# Patient Record
Sex: FEMALE | Race: WHITE | Employment: UNEMPLOYED | URBAN - METROPOLITAN AREA
[De-identification: names, ages, dates, MRNs, and addresses within clinical notes are randomized per-mention and may not be internally consistent; named-entity substitution may affect disease eponyms.]

---

## 2017-03-28 ENCOUNTER — OFFICE VISIT (OUTPATIENT)
Dept: FAMILY MEDICINE CLINIC | Age: 32
End: 2017-03-28
Payer: COMMERCIAL

## 2017-03-28 VITALS
WEIGHT: 177 LBS | BODY MASS INDEX: 29.49 KG/M2 | DIASTOLIC BLOOD PRESSURE: 70 MMHG | HEART RATE: 64 BPM | SYSTOLIC BLOOD PRESSURE: 110 MMHG | RESPIRATION RATE: 18 BRPM | HEIGHT: 65 IN

## 2017-03-28 DIAGNOSIS — R45.89 DYSPHORIC MOOD: Primary | ICD-10-CM

## 2017-03-28 PROCEDURE — 99213 OFFICE O/P EST LOW 20 MIN: CPT | Performed by: NURSE PRACTITIONER

## 2017-03-28 RX ORDER — ESCITALOPRAM OXALATE 10 MG/1
10 TABLET ORAL DAILY
Qty: 30 TABLET | Refills: 1 | Status: SHIPPED | OUTPATIENT
Start: 2017-03-28 | End: 2017-05-24 | Stop reason: SDUPTHER

## 2017-03-28 ASSESSMENT — ENCOUNTER SYMPTOMS
COUGH: 0
RHINORRHEA: 0
SHORTNESS OF BREATH: 0
ABDOMINAL DISTENTION: 0
SINUS PRESSURE: 0
EYES NEGATIVE: 1

## 2017-04-19 ENCOUNTER — OFFICE VISIT (OUTPATIENT)
Dept: FAMILY MEDICINE CLINIC | Age: 32
End: 2017-04-19
Payer: COMMERCIAL

## 2017-04-19 VITALS
SYSTOLIC BLOOD PRESSURE: 118 MMHG | RESPIRATION RATE: 18 BRPM | BODY MASS INDEX: 29.99 KG/M2 | HEIGHT: 65 IN | WEIGHT: 180 LBS | HEART RATE: 66 BPM | DIASTOLIC BLOOD PRESSURE: 78 MMHG

## 2017-04-19 DIAGNOSIS — R45.89 DYSPHORIC MOOD: Primary | ICD-10-CM

## 2017-04-19 DIAGNOSIS — Z79.899 ON SSRI THERAPY: ICD-10-CM

## 2017-04-19 PROCEDURE — 99212 OFFICE O/P EST SF 10 MIN: CPT | Performed by: NURSE PRACTITIONER

## 2017-04-19 ASSESSMENT — ENCOUNTER SYMPTOMS
ABDOMINAL DISTENTION: 0
RHINORRHEA: 0
EYES NEGATIVE: 1
WHEEZING: 0
SINUS PRESSURE: 0
COUGH: 0

## 2017-05-24 ENCOUNTER — TELEPHONE (OUTPATIENT)
Dept: FAMILY MEDICINE CLINIC | Age: 32
End: 2017-05-24

## 2017-05-24 DIAGNOSIS — R45.89 DYSPHORIC MOOD: ICD-10-CM

## 2017-05-24 RX ORDER — ESCITALOPRAM OXALATE 20 MG/1
20 TABLET ORAL DAILY
Qty: 30 TABLET | Refills: 2 | Status: SHIPPED | OUTPATIENT
Start: 2017-05-24 | End: 2017-11-20 | Stop reason: ALTCHOICE

## 2017-11-20 ENCOUNTER — OFFICE VISIT (OUTPATIENT)
Dept: FAMILY MEDICINE CLINIC | Age: 32
End: 2017-11-20
Payer: COMMERCIAL

## 2017-11-20 VITALS
OXYGEN SATURATION: 98 % | SYSTOLIC BLOOD PRESSURE: 122 MMHG | HEART RATE: 87 BPM | DIASTOLIC BLOOD PRESSURE: 84 MMHG | WEIGHT: 199 LBS | BODY MASS INDEX: 33.15 KG/M2 | HEIGHT: 65 IN

## 2017-11-20 DIAGNOSIS — E66.09 CLASS 1 OBESITY DUE TO EXCESS CALORIES WITHOUT SERIOUS COMORBIDITY WITH BODY MASS INDEX (BMI) OF 33.0 TO 33.9 IN ADULT: Primary | ICD-10-CM

## 2017-11-20 PROCEDURE — 99213 OFFICE O/P EST LOW 20 MIN: CPT | Performed by: FAMILY MEDICINE

## 2017-11-20 RX ORDER — PHENTERMINE HYDROCHLORIDE 37.5 MG/1
37.5 TABLET ORAL
Qty: 30 TABLET | Refills: 0 | Status: SHIPPED | OUTPATIENT
Start: 2017-11-20 | End: 2017-12-20

## 2017-11-20 ASSESSMENT — PATIENT HEALTH QUESTIONNAIRE - PHQ9
1. LITTLE INTEREST OR PLEASURE IN DOING THINGS: 0
SUM OF ALL RESPONSES TO PHQ9 QUESTIONS 1 & 2: 0
2. FEELING DOWN, DEPRESSED OR HOPELESS: 0
SUM OF ALL RESPONSES TO PHQ QUESTIONS 1-9: 0

## 2017-11-20 NOTE — PROGRESS NOTES
HPI Notes    Name: Olaf Christianson  : 1985         Chief Complaint:     Chief Complaint   Patient presents with    Obesity     patient looking to start a diet medication        History of Present Illness:      Olaf Christianson is a 29 y.o.  female who presents with Obesity (patient looking to start a diet medication )      HPI  Patient here to discuss starting medication to help her lose weight. Patient states that she has tried to lose weight in the past with diet and exercise, but has not been successful as she would like to be. Patient would like to consider Adipex. Patient denies any congenital cardiac abnormalities or other significant cardiac history. Patient denies any chest pain, shortness of breath, blurred vision, or headaches. No other acute problems or complaints. Past Medical History:     History reviewed. No pertinent past medical history. Reviewed all health maintenance requirements and ordered appropriate tests  Health Maintenance Due   Topic Date Due    HIV screen  2002    DTaP/Tdap/Td vaccine (1 - Tdap) 2006    Cervical cancer screen  12/10/2015    Flu vaccine (1) 2017       Past Surgical History:     Past Surgical History:   Procedure Laterality Date    DILATION AND CURETTAGE OF UTERUS      blighted ovum    DILATION AND CURETTAGE OF UTERUS      [facility]yp [facility]    TUBAL LIGATION          Medications:       Prior to Admission medications    Medication Sig Start Date End Date Taking? Authorizing Provider   phentermine (ADIPEX-P) 37.5 MG tablet Take 1 tablet by mouth every morning (before breakfast) . 17 Yes Mague Ee DO        Allergies:       Sulfa antibiotics    Social History:     Tobacco:    reports that she has never smoked. She has never used smokeless tobacco.  Alcohol:      reports that she drinks alcohol. Drug Use:  reports that she does not use drugs. Family History:     History reviewed.  No pertinent family history. Review of Systems:     Positive and Negative as described in HPI    Review of Systems   Constitutional: Negative for activity change, appetite change, fever and unexpected weight change. HENT: Negative for ear discharge, ear pain and trouble swallowing. Eyes: Negative for photophobia and visual disturbance. Respiratory: Negative for cough, shortness of breath and wheezing. Cardiovascular: Negative for chest pain and palpitations. Gastrointestinal: Negative for abdominal distention, abdominal pain, constipation, diarrhea, nausea and vomiting. Endocrine: Negative for polydipsia, polyphagia and polyuria. Musculoskeletal: Negative for arthralgias, back pain, gait problem, joint swelling, myalgias, neck pain and neck stiffness. Skin: Negative for color change and rash. Neurological: Negative for dizziness, syncope, weakness, light-headedness and headaches. Psychiatric/Behavioral: Negative for dysphoric mood. The patient is not nervous/anxious. Physical Exam:     Physical Exam   Constitutional: She is oriented to person, place, and time. She appears well-developed and well-nourished. HENT:   Head: Normocephalic and atraumatic. Right Ear: External ear normal.   Left Ear: External ear normal.   Eyes: Conjunctivae and EOM are normal.   Neck: Normal range of motion. Neck supple. No thyromegaly present. Cardiovascular: Normal rate, regular rhythm and normal heart sounds. Exam reveals no gallop and no friction rub. No murmur heard. Pulmonary/Chest: Effort normal and breath sounds normal. No respiratory distress. She has no wheezes. She has no rales. She exhibits no tenderness. Abdominal: Soft. Bowel sounds are normal. She exhibits no distension. There is no tenderness. There is no rebound and no guarding. Musculoskeletal: Normal range of motion. She exhibits no edema. Lymphadenopathy:     She has no cervical adenopathy.    Neurological: She is alert and oriented to person, place, and time. She exhibits normal muscle tone. Coordination normal.   Skin: Skin is warm and dry. No rash noted. No erythema. Psychiatric: She has a normal mood and affect. Her behavior is normal. Judgment and thought content normal.   Nursing note and vitals reviewed. Vitals:  /84   Pulse 87   Ht 5' 5\" (1.651 m)   Wt 199 lb (90.3 kg)   LMP 11/19/2017   SpO2 98%   BMI 33.12 kg/m²       Data:     No results found for: NA, K, CL, CO2, BUN, CREATININE, GLUCOSE, PROT, LABALBU, BILITOT, ALKPHOS, AST, ALT  No results found for: WBC, RBC, HGB, HCT, MCV, MCH, MCHC, RDW, PLT, MPV  No results found for: TSH  No results found for: CHOL, HDL, PSA, LABA1C       Assessment:       1. Class 1 obesity due to excess calories without serious comorbidity with body mass index (BMI) of 33.0 to 33.9 in adult         Plan:     1. Obesity-done which shows normal sinus rhythm, will Rx Adipex. Discussed diet and exercise in addition to the medication. Patient voiced understanding. We will follow-up in one month for weight and blood pressure check. Completed Refills   Requested Prescriptions     Signed Prescriptions Disp Refills    phentermine (ADIPEX-P) 37.5 MG tablet 30 tablet 0     Sig: Take 1 tablet by mouth every morning (before breakfast) . Return in about 1 month (around 12/20/2017) for Weight and BP check. Orders Placed This Encounter   Medications    phentermine (ADIPEX-P) 37.5 MG tablet     Sig: Take 1 tablet by mouth every morning (before breakfast) . Dispense:  30 tablet     Refill:  0     No orders of the defined types were placed in this encounter. There are no Patient Instructions on file for this visit.     Electronically signed by Hilary Watkins DO on 12/3/2017 at 10:51 PM         Completed Refills   Requested Prescriptions     Signed Prescriptions Disp Refills    phentermine (ADIPEX-P) 37.5 MG tablet 30 tablet 0     Sig: Take 1 tablet by mouth every morning (before breakfast) . Dong Pouch received counseling on the following healthy behaviors: nutrition, exercise and medication adherence  Reviewed prior labs and health maintenance. Continue current medications, diet and exercise. Discussed use, benefit, and side effects of prescribed medications. Barriers to medication compliance addressed. Patient given educational materials - see patient instructions. All patient questions answered. Patient voiced understanding.

## 2017-12-03 ASSESSMENT — ENCOUNTER SYMPTOMS
COUGH: 0
COLOR CHANGE: 0
TROUBLE SWALLOWING: 0
PHOTOPHOBIA: 0
SHORTNESS OF BREATH: 0
WHEEZING: 0
BACK PAIN: 0
ABDOMINAL PAIN: 0
CONSTIPATION: 0
DIARRHEA: 0
NAUSEA: 0
ABDOMINAL DISTENTION: 0
VOMITING: 0

## 2018-02-05 ENCOUNTER — TELEPHONE (OUTPATIENT)
Dept: FAMILY MEDICINE CLINIC | Age: 33
End: 2018-02-05

## 2018-02-05 DIAGNOSIS — N30.00 ACUTE CYSTITIS WITHOUT HEMATURIA: Primary | ICD-10-CM

## 2018-02-05 RX ORDER — NITROFURANTOIN 25; 75 MG/1; MG/1
100 CAPSULE ORAL 2 TIMES DAILY
Qty: 14 CAPSULE | Refills: 0 | Status: SHIPPED | OUTPATIENT
Start: 2018-02-05 | End: 2018-02-12

## 2018-02-23 ENCOUNTER — HOSPITAL ENCOUNTER (OUTPATIENT)
Age: 33
Setting detail: SPECIMEN
Discharge: HOME | End: 2018-02-23
Payer: COMMERCIAL

## 2018-02-23 ENCOUNTER — OFFICE VISIT (OUTPATIENT)
Dept: PRIMARY CARE CLINIC | Age: 33
End: 2018-02-23
Payer: COMMERCIAL

## 2018-02-23 VITALS
TEMPERATURE: 97.8 F | DIASTOLIC BLOOD PRESSURE: 83 MMHG | HEART RATE: 74 BPM | HEIGHT: 65 IN | RESPIRATION RATE: 18 BRPM | SYSTOLIC BLOOD PRESSURE: 125 MMHG | WEIGHT: 187.1 LBS | BODY MASS INDEX: 31.17 KG/M2 | OXYGEN SATURATION: 99 %

## 2018-02-23 DIAGNOSIS — R30.0 BURNING WITH URINATION: ICD-10-CM

## 2018-02-23 DIAGNOSIS — R30.0 DYSURIA: Primary | ICD-10-CM

## 2018-02-23 LAB
BILIRUBIN, POC: NORMAL
BLOOD URINE, POC: NORMAL
CLARITY, POC: CLEAR
COLOR, POC: YELLOW
DIRECT EXAM: ABNORMAL
GLUCOSE URINE, POC: NORMAL
KETONES, POC: NORMAL
LEUKOCYTE EST, POC: NORMAL
Lab: ABNORMAL
NITRITE, POC: NORMAL
PH, POC: 7
PROTEIN, POC: NORMAL
SPECIFIC GRAVITY, POC: 1.02
SPECIMEN DESCRIPTION: ABNORMAL
STATUS: ABNORMAL
UROBILINOGEN, POC: 0.2

## 2018-02-23 PROCEDURE — 87210 SMEAR WET MOUNT SALINE/INK: CPT

## 2018-02-23 PROCEDURE — 81003 URINALYSIS AUTO W/O SCOPE: CPT | Performed by: NURSE PRACTITIONER

## 2018-02-23 PROCEDURE — 87070 CULTURE OTHR SPECIMN AEROBIC: CPT

## 2018-02-23 PROCEDURE — 99213 OFFICE O/P EST LOW 20 MIN: CPT | Performed by: NURSE PRACTITIONER

## 2018-02-23 PROCEDURE — 87086 URINE CULTURE/COLONY COUNT: CPT

## 2018-02-23 ASSESSMENT — ENCOUNTER SYMPTOMS
GASTROINTESTINAL NEGATIVE: 1
RESPIRATORY NEGATIVE: 1

## 2018-02-23 NOTE — PROGRESS NOTES
[facility] WALK-[facility]  [place] [address] [id number]  Dept: [phone]  Dept Fax: [phone]    Christa Mitchell is a 29 y.o. female who presents to the [address] [place] [address] in Care today for her medical conditions/complaints as noted below. Christa Mitchell is c/o of Urinary Tract Infection (buring)      HPI:   HPI  Christa Mitchell is a 29 y.o. female who presents with x \" a few weeks ago I had a uti and I completed the antibiotic and a about 5 days After completing the antibiotic the symptoms returned. \"  Patient reports the only symptom she is having is burning after urination. She reports it feels like the burning is at the refill. There is no burning during urination. No urgency. No incontinence. No abdominal pain or pressure. No flank pain. No fevers. There is no nausea and vomiting. She has noticed an odor with the urine and some increase in vaginal discharge. There is been no vaginal itching. She reports she is in a monogamous relationship with her  only. She has no concerns for STI's. No past medical history on file. No current outpatient prescriptions on file. No current facility-administered medications for this visit. Allergies   Allergen Reactions    Sulfa Antibiotics Hives    Sulfasalazine Hives       Subjective:      Review of Systems   Constitutional: Negative for chills and fever. HENT: Negative. Respiratory: Negative. Cardiovascular: Negative. Gastrointestinal: Negative. Genitourinary: Positive for vaginal discharge. Negative for decreased urine volume, dysuria, flank pain, frequency, genital sores, hematuria, pelvic pain, urgency, vaginal bleeding and vaginal pain. Skin: Negative. Neurological: Negative. Hematological: Negative. Objective:     Physical Exam   Constitutional: She appears well-developed and well-nourished.     Appearing to be of stated age with warm and dry skin, no apparent distress; well-appearing, well-hydrated, non-toxic, comfortable, alert and oriented, pleasant and talkative, in no apparent distress. Cardiovascular: Normal rate and regular rhythm. Pulmonary/Chest: Effort normal and breath sounds normal.   Abdominal: Soft. Normal appearance. There is no CVA tenderness. Genitourinary: There is no rash or lesion on the right labia. There is no rash or lesion on the left labia. Cervix exhibits discharge (clear stringy mucous ). Cervix exhibits no friability. No erythema in the vagina. Vaginal discharge (clear stringy discharge ) found. Skin: Skin is warm. Nursing note and vitals reviewed. /83   Pulse 74   Temp 97.8 °F (36.6 °C) (Oral)   Resp 18   Ht 5' 5\" (1.651 m)   Wt 187 lb 1.6 oz (84.9 kg)   LMP 02/02/2018   SpO2 99%   BMI 31.14 kg/m²     Assessment:     1. Dysuria     2. Burning with urination  POCT Urinalysis No Micro (Auto)    Urine Culture    Wet Prep, Genital    Genital Culture       Plan:   Elena Smith was seen today for urinary tract infection. Diagnoses and all orders for this visit:    Dysuria    Burning with urination  -     POCT Urinalysis No Micro (Auto)    -   Negative  -     Urine Culture  -     Wet Prep, Genital  -     Genital Culture    Discussed exam, POCT findings, plan of care (including prescriptive and supportive as listed below) and follow-up at length with patient. Labs as above recommended today as UA negative. Urine sample will be sent for culture. Understanding voiced. ED for any worsening and or including fever, nausea/vomiting,inability to retain antibiotics,  back pain. Questions answered. They verbalized understanding and agreement. Elena Smith and or guardian received counseling regarding nutrition    Return for ED for worsening symptoms. No orders of the defined types were placed in this encounter.          Electronically signed by Luis Stewart NP on 2/23/2018 at 6:12 PM

## 2018-02-23 NOTE — PATIENT INSTRUCTIONS
Patient Education        Painful Urination (Dysuria): Care Instructions  Your Care Instructions  Burning pain with urination (dysuria) is a common symptom of a urinary tract infection or other urinary problems. The bladder may become inflamed. This can cause pain when the bladder fills and empties. You may also feel pain if the tube that carries urine from the bladder to the outside of the body (urethra) gets irritated or infected. Sexually transmitted infections (STIs) also may cause pain when you urinate. Sometimes the pain can be caused by things other than an infection. The urethra can be irritated by soaps, perfumes, or foreign objects in the urethra. Kidney stones can cause pain when they pass through the urethra. The cause may be hard to find. You may need tests. Treatment for painful urination depends on the cause. Follow-up care is a key part of your treatment and safety. Be sure to make and go to all appointments, and call your doctor if you are having problems. It's also a good idea to know your test results and keep a list of the medicines you take. How can you care for yourself at home? · Drink extra water for the next day or two. This will help make the urine less concentrated. (If you have kidney, heart, or liver disease and have to limit fluids, talk with your doctor before you increase the amount of fluids you drink.)  · Avoid drinks that are carbonated or have caffeine. They can irritate the bladder. · Urinate often. Try to empty your bladder each time. For women:  · Urinate right after you have sex. · After going to the bathroom, wipe from front to back. · Avoid douches, bubble baths, and feminine hygiene sprays. And avoid other feminine hygiene products that have deodorants. When should you call for help? Call your doctor now or seek immediate medical care if:  ? · You have new symptoms, such as fever, nausea, or vomiting. ? · You have new or worse symptoms of a urinary problem.

## 2018-02-24 LAB
CULTURE: NORMAL
CULTURE: NORMAL
Lab: NORMAL
SPECIMEN DESCRIPTION: NORMAL
SPECIMEN DESCRIPTION: NORMAL
STATUS: NORMAL

## 2018-02-27 LAB
CULTURE: NORMAL
Lab: NORMAL
SPECIMEN DESCRIPTION: NORMAL
SPECIMEN DESCRIPTION: NORMAL
STATUS: NORMAL

## 2018-03-15 ENCOUNTER — OFFICE VISIT (OUTPATIENT)
Dept: FAMILY MEDICINE CLINIC | Age: 33
End: 2018-03-15
Payer: COMMERCIAL

## 2018-03-15 VITALS
SYSTOLIC BLOOD PRESSURE: 110 MMHG | DIASTOLIC BLOOD PRESSURE: 70 MMHG | HEIGHT: 66 IN | RESPIRATION RATE: 18 BRPM | WEIGHT: 181 LBS | BODY MASS INDEX: 29.09 KG/M2 | OXYGEN SATURATION: 98 % | HEART RATE: 94 BPM

## 2018-03-15 DIAGNOSIS — F41.9 ANXIETY: Primary | ICD-10-CM

## 2018-03-15 DIAGNOSIS — F32.9 REACTIVE DEPRESSION: ICD-10-CM

## 2018-03-15 PROCEDURE — 99213 OFFICE O/P EST LOW 20 MIN: CPT | Performed by: NURSE PRACTITIONER

## 2018-03-15 RX ORDER — ESCITALOPRAM OXALATE 20 MG/1
20 TABLET ORAL DAILY
Qty: 30 TABLET | Refills: 2 | Status: SHIPPED | OUTPATIENT
Start: 2018-03-15 | End: 2018-07-31 | Stop reason: SDUPTHER

## 2018-03-15 ASSESSMENT — ENCOUNTER SYMPTOMS
SORE THROAT: 0
EYES NEGATIVE: 1
CHEST TIGHTNESS: 0
SINUS PAIN: 0
ABDOMINAL DISTENTION: 0

## 2018-03-15 NOTE — PATIENT INSTRUCTIONS
play a relaxation tape while you drive a car. When should you call for help? Call 911 anytime you think you may need emergency care. For example, call if:  ? · You feel you cannot stop from hurting yourself or someone else. ? Keep the numbers for these national suicide hotlines: 0-285-154-TALK (8-568.334.9982) and 0-758-JLFXFTX (8-163.587.3897). If you or someone you know talks about suicide or feeling hopeless, get help right away. ? Watch closely for changes in your health, and be sure to contact your doctor if:  ? · You have anxiety or fear that affects your life. ? · You have symptoms of anxiety that are new or different from those you had before. Where can you learn more? Go to https://Discount Rampsjunioreb.Qui.lt. org and sign in to your [employer] account. Enter P754 in the [employer] box to learn more about \"Anxiety Disorder: Care Instructions. \"     If you do not have an account, please click on the \"Sign Up Now\" link. Current as of: May 12, 2017  Content Version: 11.5  © 5304-6039 Healthwise, Incorporated. Care instructions adapted under license by [facility] ([facility]). If you have questions about a medical condition or this instruction, always ask your healthcare professional. [address] any warranty or liability for your use of this information.

## 2018-03-15 NOTE — PROGRESS NOTES
MG tablet     Sig: Take 1 tablet by mouth daily     Dispense:  30 tablet     Refill:  2     No orders of the defined types were placed in this encounter. Jaime Mohs received counseling on the following healthy behaviors: nutrition, exercise and medication adherence  Reviewed prior labs and health maintenance. Continue current medications, diet and exercise. Discussed use, benefit, and side effects of prescribed medications. Barriers to medication compliance addressed. Patient given educational materials - see patient instructions. All patient questions answered. Patient voiced understanding.           Electronically signed by Jesi Santana NP on 3/15/2018 at 10:52 PM

## 2018-04-20 ENCOUNTER — OFFICE VISIT (OUTPATIENT)
Dept: FAMILY MEDICINE CLINIC | Age: 33
End: 2018-04-20
Payer: COMMERCIAL

## 2018-04-20 VITALS
OXYGEN SATURATION: 98 % | HEART RATE: 84 BPM | HEIGHT: 65 IN | SYSTOLIC BLOOD PRESSURE: 120 MMHG | WEIGHT: 183 LBS | RESPIRATION RATE: 18 BRPM | DIASTOLIC BLOOD PRESSURE: 66 MMHG | BODY MASS INDEX: 30.49 KG/M2

## 2018-04-20 DIAGNOSIS — Z79.899 ON SSRI THERAPY: ICD-10-CM

## 2018-04-20 DIAGNOSIS — F41.9 ANXIETY AND DEPRESSION: Primary | ICD-10-CM

## 2018-04-20 DIAGNOSIS — F32.A ANXIETY AND DEPRESSION: Primary | ICD-10-CM

## 2018-04-20 PROCEDURE — 99213 OFFICE O/P EST LOW 20 MIN: CPT | Performed by: NURSE PRACTITIONER

## 2018-04-20 ASSESSMENT — ENCOUNTER SYMPTOMS
EYES NEGATIVE: 1
ABDOMINAL DISTENTION: 0
SORE THROAT: 0
CHEST TIGHTNESS: 0

## 2018-05-22 ENCOUNTER — TELEPHONE (OUTPATIENT)
Dept: FAMILY MEDICINE CLINIC | Age: 33
End: 2018-05-22

## 2018-05-23 RX ORDER — ERYTHROMYCIN 5 MG/G
1 OINTMENT OPHTHALMIC NIGHTLY
Qty: 1 TUBE | Refills: 0 | Status: SHIPPED | OUTPATIENT
Start: 2018-05-23 | End: 2019-04-03 | Stop reason: ALTCHOICE

## 2018-07-31 ENCOUNTER — OFFICE VISIT (OUTPATIENT)
Dept: FAMILY MEDICINE CLINIC | Age: 33
End: 2018-07-31
Payer: COMMERCIAL

## 2018-07-31 VITALS — HEART RATE: 80 BPM | RESPIRATION RATE: 18 BRPM | SYSTOLIC BLOOD PRESSURE: 122 MMHG | DIASTOLIC BLOOD PRESSURE: 60 MMHG

## 2018-07-31 DIAGNOSIS — B35.3 TINEA PEDIS OF BOTH FEET: ICD-10-CM

## 2018-07-31 DIAGNOSIS — F32.9 REACTIVE DEPRESSION: Primary | ICD-10-CM

## 2018-07-31 DIAGNOSIS — F41.9 ANXIETY: ICD-10-CM

## 2018-07-31 DIAGNOSIS — H73.893 RETRACTED TYMPANIC MEMBRANE, BILATERAL: ICD-10-CM

## 2018-07-31 PROCEDURE — 99213 OFFICE O/P EST LOW 20 MIN: CPT | Performed by: NURSE PRACTITIONER

## 2018-07-31 RX ORDER — ESCITALOPRAM OXALATE 20 MG/1
20 TABLET ORAL DAILY
Qty: 90 TABLET | Refills: 1 | Status: SHIPPED | OUTPATIENT
Start: 2018-07-31 | End: 2019-03-11 | Stop reason: SDUPTHER

## 2018-07-31 RX ORDER — RANITIDINE 150 MG/1
150 TABLET ORAL NIGHTLY
Qty: 30 TABLET | Refills: 0 | Status: SHIPPED | OUTPATIENT
Start: 2018-07-31 | End: 2019-04-03 | Stop reason: ALTCHOICE

## 2018-07-31 RX ORDER — PRENATAL VIT 91/IRON/FOLIC/DHA 28-975-200
COMBINATION PACKAGE (EA) ORAL
Qty: 36 G | Refills: 1 | Status: SHIPPED | OUTPATIENT
Start: 2018-07-31 | End: 2019-04-03 | Stop reason: ALTCHOICE

## 2018-07-31 ASSESSMENT — ENCOUNTER SYMPTOMS
ABDOMINAL DISTENTION: 0
SORE THROAT: 0
CHEST TIGHTNESS: 0
WHEEZING: 0
COUGH: 0
EYES NEGATIVE: 1

## 2018-07-31 NOTE — PROGRESS NOTES
Subjective:      Patient ID: Alphonso Cage is a 34 y.o. female. Social History     Social History    Marital status:      Spouse name: N/A    Number of children: N/A    Years of education: N/A     Social History Main Topics    Smoking status: Never Smoker    Smokeless tobacco: Never Used    Alcohol use Yes    Drug use: No    Sexual activity: Not Asked     Other Topics Concern    None     Social History Narrative    None     History reviewed. No pertinent family history. History reviewed. No pertinent past medical history. HPI    Follow up lexapro medication doing well- lexapro 20 mg daily for depression. Separation but living together with . Managing children. Feels she is coping better. Not as angry and reactive. Feels life if okay. No insomnia improved since changing med time before bedtime    C/o toenail itching and fungus used over the counter cream offered to clean bathtub with white vinegar after bathing, blow toes dry with hair dryer after shower. May use prescription cream lamisil ordered. Change shoes and clean   May use lysol and dry in sun. C/o recent hearing loss no injury, ears feel funny sense like cannot make out noises well. Has tonsillar stones passes at times with cryptic tonsils. Review of Systems   Constitutional: Negative for activity change and fatigue. HENT: Negative for congestion, postnasal drip and sore throat. Eyes: Negative. Respiratory: Negative for cough, chest tightness and wheezing. Cardiovascular: Negative for chest pain. Gastrointestinal: Negative for abdominal distention. Genitourinary: Negative for difficulty urinating. Musculoskeletal: Negative for arthralgias. Neurological: Negative for dizziness. Psychiatric/Behavioral: Negative for dysphoric mood, self-injury and sleep disturbance. The patient is nervous/anxious.         Objective:   Physical Exam   Constitutional: She is oriented to person, place, and time. She appears well-developed and well-nourished. No distress. HENT:   Head: Normocephalic and atraumatic. Right Ear: External ear normal.   Left Ear: External ear normal.   Mouth/Throat: Oropharynx is clear and moist.   TM retracted bilaterally intact without infection. Left ear diminished visualization through lower TM discussed with pt risk cholesteatoma. Eyes: Pupils are equal, round, and reactive to light. No scleral icterus. Neck: Normal range of motion. Neck supple. No thyromegaly present. Cardiovascular: Normal rate, regular rhythm and normal heart sounds. No murmur heard. Pulmonary/Chest: Effort normal and breath sounds normal. No respiratory distress. She has no wheezes. Abdominal: Soft. There is no tenderness. Musculoskeletal: Normal range of motion. She exhibits no edema. Lymphadenopathy:     She has no cervical adenopathy. Neurological: She is alert and oriented to person, place, and time. Skin: Skin is warm and dry. No rash noted. Psychiatric: She has a normal mood and affect. Her behavior is normal. Judgment and thought content normal.   Nursing note and vitals reviewed. Assessment / Plan:   1. Reactive depression  Doing well  Recheck in 6 months    - escitalopram (LEXAPRO) 20 MG tablet; Take 1 tablet by mouth daily  Dispense: 90 tablet; Refill: 1    2. Tinea pedis of both feet  Suggestions given. - terbinafine (LAMISIL) 1 % cream; Apply topically 2 times daily to both feet  Dispense: 36 g; Refill: 1    3. Anxiety  Well controlled  - escitalopram (LEXAPRO) 20 MG tablet; Take 1 tablet by mouth daily  Dispense: 90 tablet; Refill: 1    4. Retracted tympanic membrane, bilateral  C/o hearing issues, TM retracted bilaterally, decrease daily zyrtec use. May consider zantac 150 mg nightly x 1 month  Monitor for improvement.      If hearing issues continue will refer for audiology eval/ent referral.     1.  Kevon Gaspar received counseling on the following healthy

## 2018-07-31 NOTE — PATIENT INSTRUCTIONS
Patient Education        Athlete's Foot: Care Instructions  Your Care Instructions    Athlete's foot is an itchy rash on the foot caused by an infection with a fungus. You can get it by going barefoot in wet public areas, such as swimming pools or locker rooms. Many times there is no clear reason why you get athlete's foot. You can easily treat athlete's foot by putting medicine on your feet for 1 to 6 weeks. In some cases, a doctor may prescribe pills to kill the fungus. Follow-up care is a key part of your treatment and safety. Be sure to make and go to all appointments, and call your doctor if you are having problems. It's also a good idea to know your test results and keep a list of the medicines you take. How can you care for yourself at home? · Your doctor may suggest an over-the counter lotion or spray or may prescribe a medicine. Take your medicines exactly as prescribed. Call your doctor if you think you are having a problem with your medicine. · Keep your feet clean and dry. · When you get dressed, put your socks on before your underwear. This can prevent the fungus from spreading from your feet to your groin. To prevent athlete's foot  · Wear flip-flops or other shower sandals in public locker rooms and showers and by the pool. · Dry between your toes after swimming or bathing. · Wear leather shoes or sandals, which let air get to your feet. · Change your socks as needed so your feet stay as dry as possible. · Use antifungal powder on your feet. When should you call for help? Watch closely for changes in your health, and be sure to contact your doctor if:    · You do not get better as expected. Where can you learn more? Go to https://chsamantha.LEHR. org and sign in to your [employer] account. Enter M498 in the [employer] box to learn more about \"Athlete's Foot: Care Instructions. \"     If you do not have an account, please click on the \"Sign Up Now\" link.   Current as of: October 5, 2017  Content Version: 11.6  © 4396-7313 [employer]orporated. Care instructions adapted under license by [facility] ([facility]). If you have questions about a medical condition or this instruction, always ask your healthcare professional. [address] any warranty or liability for your use of this information.

## 2019-03-11 DIAGNOSIS — F41.9 ANXIETY: ICD-10-CM

## 2019-03-11 DIAGNOSIS — F32.9 REACTIVE DEPRESSION: ICD-10-CM

## 2019-03-12 RX ORDER — ESCITALOPRAM OXALATE 20 MG/1
TABLET ORAL
Qty: 90 TABLET | Refills: 1 | Status: SHIPPED | OUTPATIENT
Start: 2019-03-12 | End: 2019-10-25

## 2019-03-13 DIAGNOSIS — K21.9 GASTROESOPHAGEAL REFLUX DISEASE WITHOUT ESOPHAGITIS: ICD-10-CM

## 2019-03-13 DIAGNOSIS — K21.00 GASTROESOPHAGEAL REFLUX DISEASE WITH ESOPHAGITIS: Primary | ICD-10-CM

## 2019-03-13 RX ORDER — OMEPRAZOLE 20 MG/1
20 CAPSULE, DELAYED RELEASE ORAL DAILY
Qty: 90 CAPSULE | Refills: 0 | Status: SHIPPED | OUTPATIENT
Start: 2019-03-13 | End: 2019-07-14 | Stop reason: SDUPTHER

## 2019-04-03 ENCOUNTER — OFFICE VISIT (OUTPATIENT)
Dept: FAMILY MEDICINE CLINIC | Age: 34
End: 2019-04-03
Payer: COMMERCIAL

## 2019-04-03 VITALS
BODY MASS INDEX: 33.48 KG/M2 | SYSTOLIC BLOOD PRESSURE: 138 MMHG | OXYGEN SATURATION: 98 % | HEART RATE: 110 BPM | TEMPERATURE: 98.6 F | WEIGHT: 201.2 LBS | DIASTOLIC BLOOD PRESSURE: 88 MMHG

## 2019-04-03 DIAGNOSIS — H66.001 ACUTE SUPPURATIVE OTITIS MEDIA OF RIGHT EAR WITHOUT SPONTANEOUS RUPTURE OF TYMPANIC MEMBRANE, RECURRENCE NOT SPECIFIED: Primary | ICD-10-CM

## 2019-04-03 DIAGNOSIS — J02.9 SORE THROAT: ICD-10-CM

## 2019-04-03 LAB — S PYO AG THROAT QL: NORMAL

## 2019-04-03 PROCEDURE — 99213 OFFICE O/P EST LOW 20 MIN: CPT | Performed by: NURSE PRACTITIONER

## 2019-04-03 PROCEDURE — 87880 STREP A ASSAY W/OPTIC: CPT | Performed by: NURSE PRACTITIONER

## 2019-04-03 RX ORDER — AMOXICILLIN 500 MG/1
500 CAPSULE ORAL 3 TIMES DAILY
Qty: 30 CAPSULE | Refills: 0 | Status: SHIPPED | OUTPATIENT
Start: 2019-04-03 | End: 2019-04-13

## 2019-04-03 RX ORDER — PHENTERMINE HYDROCHLORIDE 37.5 MG/1
37.5 CAPSULE ORAL EVERY MORNING
COMMUNITY
End: 2019-10-25 | Stop reason: ALTCHOICE

## 2019-04-03 ASSESSMENT — PATIENT HEALTH QUESTIONNAIRE - PHQ9
2. FEELING DOWN, DEPRESSED OR HOPELESS: 0
SUM OF ALL RESPONSES TO PHQ QUESTIONS 1-9: 0
1. LITTLE INTEREST OR PLEASURE IN DOING THINGS: 0
SUM OF ALL RESPONSES TO PHQ QUESTIONS 1-9: 0
SUM OF ALL RESPONSES TO PHQ9 QUESTIONS 1 & 2: 0

## 2019-04-03 NOTE — PROGRESS NOTES
[facility]  [facility] OF [place] [address] [address]  [address]  Dept: [phone]  Dept Fax: [phone]    Last encounter 7/31/2018    HPI:   Gustavo Wing is a 34 y.o. female who presentstoday for her medical conditions/complaints as noted below. Gustavo Wing is c/o of Cough (sore throat, bilateral ear pain)      HPI    Patient with c/o sore throat pressure last night, no drainage from ears. No fever. No chills. Feels hot. URI symptoms right ear pain,     No past medical history on file. Past Surgical History:   Procedure Laterality Date    DILATION AND CURETTAGE OF UTERUS      blighted ovum    DILATION AND CURETTAGE OF UTERUS      Polyp removal-[facility]    TUBAL LIGATION  2012       No family history on file. Social History     Tobacco Use    Smoking status: Never Smoker    Smokeless tobacco: Never Used   Substance Use Topics    Alcohol use: Yes      Current Outpatient Medications   Medication Sig Dispense Refill    phentermine (ADIPEX-P) 37.5 MG capsule Take 37.5 mg by mouth every morning.  amoxicillin (AMOXIL) 500 MG capsule Take 1 capsule by mouth 3 times daily for 10 days Right ear infection 30 capsule 0    omeprazole (PRILOSEC) 20 MG delayed release capsule Take 1 capsule by mouth Daily In am on empty stomach 90 capsule 0    escitalopram (LEXAPRO) 20 MG tablet TAKE 1 TABLET BY MOUTH ONCE DAILY 90 tablet 1     No current facility-administered medications for this visit. Allergies   Allergen Reactions    Sulfa Antibiotics Hives    Sulfasalazine Hives       Health Maintenance   Topic Date Due    Varicella Vaccine (1 of 2 - 13+ 2-dose series) 05/27/2000    DTaP/Tdap/Td vaccine (1 - Tdap) 05/27/2006    Cervical cancer screen  12/10/2015    HIV screen  04/12/2019 (Originally 5/27/2002)    Flu vaccine (Season Ended) 09/01/2019       Subjective:      Review of Systems   Constitutional: Positive for chills.  Negative MPV  No results found for: TSH  No results found for: CHOL, HDL, PSA, LABA1C       Assessment & Plan       1. Acute suppurative otitis media of right ear without spontaneous rupture of tympanic membrane, recurrence not specified  Amoxicillin finish antibiotics  Good hydration    2. Sore throat  Neg strep in office    - POCT rapid strep A                  Completed Refills   Requested Prescriptions     Signed Prescriptions Disp Refills    amoxicillin (AMOXIL) 500 MG capsule 30 capsule 0     Sig: Take 1 capsule by mouth 3 times daily for 10 days Right ear infection     Return if symptoms worsen or fail to improve. Orders Placed This Encounter   Medications    amoxicillin (AMOXIL) 500 MG capsule     Sig: Take 1 capsule by mouth 3 times daily for 10 days Right ear infection     Dispense:  30 capsule     Refill:  0     Orders Placed This Encounter   Procedures    POCT rapid strep A         Maris received counseling on the following healthy behaviors: nutrition, exercise and medication adherence  Reviewed prior labs and health maintenance. Continue current medications, diet and exercise. Discussed use, benefit, and side effects of prescribed medications. Barriers to medication compliance addressed. Patient given educational materials - see patient instructions. All patient questions answered. Patient voiced understanding.           Electronically signed by SHAHIDA Lovelace CNP on 4/4/2019 at 12:35 AM

## 2019-04-03 NOTE — PATIENT INSTRUCTIONS
SURVEY:    You may be receiving a survey from [employer] regarding your visit today. Please complete the survey to enable us to provide the highest quality of care to you and your family. If you cannot score us a very good on any question, please call the office to discuss how we could have made your experience a very good one. Thank you. Patient Education        Ear Infection (Otitis Media): Care Instructions  Your Care Instructions    An ear infection may start with a cold and affect the middle ear (otitis media). It can hurt a lot. Most ear infections clear up on their own in a couple of days. Most often you will not need antibiotics. This is because many ear infections are caused by a virus. Antibiotics don't work against a virus. Regular doses of pain medicines are the best way to reduce your fever and help you feel better. Follow-up care is a key part of your treatment and safety. Be sure to make and go to all appointments, and call your doctor if you are having problems. It's also a good idea to know your test results and keep a list of the medicines you take. How can you care for yourself at home? · Take pain medicines exactly as directed. ? If the doctor gave you a prescription medicine for pain, take it as prescribed. ? If you are not taking a prescription pain medicine, take an over-the-counter medicine, such as acetaminophen (Tylenol), ibuprofen (Advil, Motrin), or naproxen (Aleve). Read and follow all instructions on the label. ? Do not take two or more pain medicines at the same time unless the doctor told you to. Many pain medicines have acetaminophen, which is Tylenol. Too much acetaminophen (Tylenol) can be harmful. · Plan to take a full dose of pain reliever before bedtime. Getting enough sleep will help you get better. · Try a warm, moist washcloth on the ear. It may help relieve pain. · If your doctor prescribed antibiotics, take them as directed.  Do not stop taking them just because you feel better. You need to take the full course of antibiotics. When should you call for help? Call your doctor now or seek immediate medical care if:    · You have new or increasing ear pain.     · You have new or increasing pus or blood draining from your ear.     · You have a fever with a stiff neck or a severe headache.    Watch closely for changes in your health, and be sure to contact your doctor if:    · You have new or worse symptoms.     · You are not getting better after taking an antibiotic for 2 days. Where can you learn more? Go to https://SnapTellpeFashion Movementeweb.OpDemand. org and sign in to your dscout account. Enter H835 in the Retty box to learn more about \"Ear Infection (Otitis Media): Care Instructions. \"     If you do not have an account, please click on the \"Sign Up Now\" link. Current as of: March 27, 2018  Content Version: 11.9  © 1308-8038 [employer] [place]. Care instructions adapted under license by [facility] ([facility]). If you have questions about a medical condition or this instruction, always ask your healthcare professional. [place] [address] any warranty or liability for your use of this information.

## 2019-04-04 ASSESSMENT — ENCOUNTER SYMPTOMS
EYES NEGATIVE: 1
COUGH: 0
RHINORRHEA: 1
ABDOMINAL DISTENTION: 0
SORE THROAT: 0

## 2019-07-14 DIAGNOSIS — K21.9 GASTROESOPHAGEAL REFLUX DISEASE WITHOUT ESOPHAGITIS: ICD-10-CM

## 2019-07-15 RX ORDER — OMEPRAZOLE 20 MG/1
CAPSULE, DELAYED RELEASE ORAL
Qty: 90 CAPSULE | Refills: 0 | Status: SHIPPED | OUTPATIENT
Start: 2019-07-15 | End: 2019-11-01 | Stop reason: SDUPTHER

## 2019-10-25 ENCOUNTER — OFFICE VISIT (OUTPATIENT)
Dept: PRIMARY CARE CLINIC | Age: 34
End: 2019-10-25
Payer: COMMERCIAL

## 2019-10-25 VITALS
HEART RATE: 69 BPM | SYSTOLIC BLOOD PRESSURE: 126 MMHG | WEIGHT: 205 LBS | TEMPERATURE: 98.3 F | HEIGHT: 65 IN | BODY MASS INDEX: 34.16 KG/M2 | DIASTOLIC BLOOD PRESSURE: 86 MMHG | OXYGEN SATURATION: 98 %

## 2019-10-25 DIAGNOSIS — F41.9 ANXIETY: ICD-10-CM

## 2019-10-25 DIAGNOSIS — F32.9 REACTIVE DEPRESSION: ICD-10-CM

## 2019-10-25 PROCEDURE — 99213 OFFICE O/P EST LOW 20 MIN: CPT | Performed by: NURSE PRACTITIONER

## 2019-10-25 RX ORDER — ESCITALOPRAM OXALATE 10 MG/1
10 TABLET ORAL DAILY
Qty: 30 TABLET | Refills: 0 | Status: SHIPPED | OUTPATIENT
Start: 2019-10-25 | End: 2019-12-11 | Stop reason: SDUPTHER

## 2019-10-27 ASSESSMENT — ENCOUNTER SYMPTOMS
COUGH: 0
SORE THROAT: 0
SINUS PAIN: 0
CHEST TIGHTNESS: 0
ABDOMINAL DISTENTION: 0
EYES NEGATIVE: 1

## 2019-11-01 DIAGNOSIS — K21.9 GASTROESOPHAGEAL REFLUX DISEASE WITHOUT ESOPHAGITIS: ICD-10-CM

## 2019-11-02 RX ORDER — OMEPRAZOLE 20 MG/1
CAPSULE, DELAYED RELEASE ORAL
Qty: 90 CAPSULE | Refills: 0 | Status: SHIPPED | OUTPATIENT
Start: 2019-11-02 | End: 2019-11-05 | Stop reason: SDUPTHER

## 2019-11-05 DIAGNOSIS — K21.9 GASTROESOPHAGEAL REFLUX DISEASE WITHOUT ESOPHAGITIS: ICD-10-CM

## 2019-11-05 RX ORDER — OMEPRAZOLE 20 MG/1
CAPSULE, DELAYED RELEASE ORAL
Qty: 90 CAPSULE | Refills: 1 | Status: SHIPPED | OUTPATIENT
Start: 2019-11-05 | End: 2020-01-17 | Stop reason: SDUPTHER

## 2019-11-11 ENCOUNTER — TELEPHONE (OUTPATIENT)
Dept: PRIMARY CARE CLINIC | Age: 34
End: 2019-11-11

## 2019-11-13 LAB
ALT SERPL-CCNC: 11 U/L
AST SERPL-CCNC: 19 U/L
BASOPHILS ABSOLUTE: NORMAL
BASOPHILS RELATIVE PERCENT: NORMAL
BUN BLDV-MCNC: 10 MG/DL
CALCIUM SERPL-MCNC: 9.4 MG/DL
CHLORIDE BLD-SCNC: 104 MMOL/L
CO2: 22 MMOL/L
CREAT SERPL-MCNC: 0.7 MG/DL
EOSINOPHILS ABSOLUTE: NORMAL
EOSINOPHILS RELATIVE PERCENT: NORMAL
GFR CALCULATED: >60
GLUCOSE BLD-MCNC: 87 MG/DL
HCT VFR BLD CALC: 39.1 % (ref 36–46)
HEMOGLOBIN: 12.7 G/DL (ref 12–16)
LYMPHOCYTES ABSOLUTE: NORMAL
LYMPHOCYTES RELATIVE PERCENT: NORMAL
MCH RBC QN AUTO: 26.3 PG
MCHC RBC AUTO-ENTMCNC: 32.5 G/DL
MCV RBC AUTO: 80.8 FL
MONOCYTES ABSOLUTE: NORMAL
MONOCYTES RELATIVE PERCENT: NORMAL
NEUTROPHILS ABSOLUTE: NORMAL
NEUTROPHILS RELATIVE PERCENT: NORMAL
PLATELET # BLD: 374 K/ΜL
PMV BLD AUTO: NORMAL FL
POTASSIUM SERPL-SCNC: 4.3 MMOL/L
RBC # BLD: 4.84 10^6/ΜL
SODIUM BLD-SCNC: 140 MMOL/L
TSH SERPL DL<=0.05 MIU/L-ACNC: 1.95 UIU/ML
WBC # BLD: 8.9 10^3/ML

## 2019-11-15 RX ORDER — BUPROPION HYDROCHLORIDE 150 MG/1
150 TABLET ORAL EVERY MORNING
Qty: 30 TABLET | Refills: 1 | Status: SHIPPED | OUTPATIENT
Start: 2019-11-15 | End: 2020-01-17 | Stop reason: SDUPTHER

## 2020-01-17 RX ORDER — OMEPRAZOLE 20 MG/1
CAPSULE, DELAYED RELEASE ORAL
Qty: 90 CAPSULE | Refills: 1 | Status: SHIPPED | OUTPATIENT
Start: 2020-01-17 | End: 2020-12-22 | Stop reason: SDUPTHER

## 2020-01-17 RX ORDER — BUPROPION HYDROCHLORIDE 150 MG/1
150 TABLET ORAL EVERY MORNING
Qty: 90 TABLET | Refills: 0 | Status: SHIPPED | OUTPATIENT
Start: 2020-01-17 | End: 2020-12-22 | Stop reason: ALTCHOICE

## 2020-01-17 RX ORDER — ESCITALOPRAM OXALATE 10 MG/1
10 TABLET ORAL DAILY
Qty: 90 TABLET | Refills: 0 | Status: SHIPPED | OUTPATIENT
Start: 2020-01-17 | End: 2020-05-12

## 2020-08-04 ENCOUNTER — TELEPHONE (OUTPATIENT)
Dept: PRIMARY CARE CLINIC | Age: 35
End: 2020-08-04

## 2020-08-04 RX ORDER — VALACYCLOVIR HYDROCHLORIDE 1 G/1
2000 TABLET, FILM COATED ORAL 2 TIMES DAILY
Qty: 4 TABLET | Refills: 2 | Status: SHIPPED | OUTPATIENT
Start: 2020-08-04 | End: 2020-08-04 | Stop reason: SDUPTHER

## 2020-08-04 RX ORDER — VALACYCLOVIR HYDROCHLORIDE 1 G/1
2000 TABLET, FILM COATED ORAL 2 TIMES DAILY
Qty: 4 TABLET | Refills: 2 | Status: SHIPPED | OUTPATIENT
Start: 2020-08-04 | End: 2020-12-22 | Stop reason: ALTCHOICE

## 2020-08-04 NOTE — TELEPHONE ENCOUNTER
Inform patient valtrex can be taken 2 pills twice a day (12 hours apart) for ONE day. Sent to pharmacy [facility], with refills if needed.      thanks

## 2020-08-04 NOTE — TELEPHONE ENCOUNTER
Pt complaining of cold sores, asking for maybe something could be called in for this. Please advise. Pt uses DM lou now. Health Maintenance   Topic Date Due    Varicella vaccine (1 of 2 - 2-dose childhood series) 05/27/1988    HIV screen  05/27/2002    DTaP/Tdap/Td vaccine (1 - Tdap) 05/27/2006    Cervical cancer screen  12/10/2015    Flu vaccine (1) 09/01/2020    Hepatitis A vaccine  Aged Out    Hepatitis B vaccine  Aged Out    Hib vaccine  Aged Out    Meningococcal (ACWY) vaccine  Aged Out    Pneumococcal 0-64 years Vaccine  Aged Out             (applicable per patient's age: Cancer Screenings, Depression Screening, Fall Risk Screening, Immunizations)    AST (U/L)   Date Value   11/13/2019 19     ALT (U/L)   Date Value   11/13/2019 11     BUN (mg/dL)   Date Value   11/13/2019 10      (goal A1C is < 7)   (goal LDL is <100) need 30-50% reduction from baseline     BP Readings from Last 3 Encounters:   10/25/19 126/86   04/03/19 138/88   07/31/18 122/60    (goal /80)      All Future Testing planned in Care[facility]:      Next Visit Date:  No future appointments.          Patient Active Problem List:     On SSRI therapy

## 2020-12-22 ENCOUNTER — OFFICE VISIT (OUTPATIENT)
Dept: PRIMARY CARE CLINIC | Age: 35
End: 2020-12-22

## 2020-12-22 VITALS
WEIGHT: 215.4 LBS | HEART RATE: 83 BPM | OXYGEN SATURATION: 98 % | TEMPERATURE: 97.4 F | BODY MASS INDEX: 35.89 KG/M2 | SYSTOLIC BLOOD PRESSURE: 132 MMHG | DIASTOLIC BLOOD PRESSURE: 84 MMHG | HEIGHT: 65 IN

## 2020-12-22 PROCEDURE — 99212 OFFICE O/P EST SF 10 MIN: CPT | Performed by: NURSE PRACTITIONER

## 2020-12-22 RX ORDER — ESCITALOPRAM OXALATE 10 MG/1
10 TABLET ORAL DAILY
Qty: 90 TABLET | Refills: 1 | Status: SHIPPED | OUTPATIENT
Start: 2020-12-22 | End: 2022-02-25

## 2020-12-22 RX ORDER — OMEPRAZOLE 20 MG/1
CAPSULE, DELAYED RELEASE ORAL
Qty: 90 CAPSULE | Refills: 1 | Status: SHIPPED | OUTPATIENT
Start: 2020-12-22 | End: 2022-02-24 | Stop reason: SDUPTHER

## 2020-12-22 ASSESSMENT — ENCOUNTER SYMPTOMS
EYES NEGATIVE: 1
ABDOMINAL DISTENTION: 0
BACK PAIN: 0
SHORTNESS OF BREATH: 0
SORE THROAT: 0
CONSTIPATION: 0
CHEST TIGHTNESS: 0
DIARRHEA: 0

## 2020-12-22 ASSESSMENT — PATIENT HEALTH QUESTIONNAIRE - PHQ9
SUM OF ALL RESPONSES TO PHQ QUESTIONS 1-9: 0
1. LITTLE INTEREST OR PLEASURE IN DOING THINGS: 0
SUM OF ALL RESPONSES TO PHQ QUESTIONS 1-9: 0
SUM OF ALL RESPONSES TO PHQ9 QUESTIONS 1 & 2: 0
SUM OF ALL RESPONSES TO PHQ QUESTIONS 1-9: 0
2. FEELING DOWN, DEPRESSED OR HOPELESS: 0

## 2020-12-22 NOTE — PATIENT INSTRUCTIONS
SURVEY:    Patient Education        Sleep Apnea: Care Instructions  Your Care Instructions     Sleep apnea means that you frequently stop breathing for 10 seconds or longer during sleep. It can be mild to severe, based on the number of times an hour that you stop breathing or have slowed breathing. Blocked or narrowed airways in your nose, mouth, or throat can cause sleep apnea. Your airway can become blocked when your throat muscles and tongue relax during sleep. You can treat sleep apnea at home by making lifestyle changes. You also can use a CPAP breathing machine that keeps tissues in the throat from blocking your airway. Or your doctor may suggest that you use a breathing device while you sleep. It helps keep your airway open. This could be a device that you put in your mouth. In some cases, surgery may be needed to remove enlarged tissues in the throat. Follow-up care is a key part of your treatment and safety. Be sure to make and go to all appointments, and call your doctor if you are having problems. It's also a good idea to know your test results and keep a list of the medicines you take. How can you care for yourself at home? · Lose weight, if needed. It may reduce the number of times you stop breathing or have slowed breathing. · Sleep on your side. It may stop mild apnea. If you tend to roll onto your back, sew a pocket in the back of your pajama top. Put a tennis ball into the pocket, and stitch the pocket shut. This will help keep you from sleeping on your back. · Avoid alcohol and medicines such as sleeping pills and sedatives before bed. · Do not smoke. Smoking can make sleep apnea worse. If you need help quitting, talk to your doctor about stop-smoking programs and medicines. These can increase your chances of quitting for good. · Prop up the head of your bed 4 to 6 inches by putting bricks under the legs of the bed.   · Treat breathing problems, such as a stuffy nose, caused by a cold or allergies. · Try a continuous positive airway pressure (CPAP) breathing machine if your doctor recommends it. The machine keeps your airway open when you sleep. · If CPAP does not work for you, ask your doctor if you can try other breathing machines. A bilevel positive airway pressure machine uses one type of air pressure for breathing in and another type for breathing out. Another device raises or lowers air pressure as needed while you breathe. · Talk to your doctor if:  ? Your nose feels dry or bleeds when you use one of these machines. You may need to increase moisture in the air. A humidifier may help. ? Your nose is runny or stuffy from using a breathing machine. Decongestants or a corticosteroid nasal spray may help. ? You are sleepy during the day and it gets in the way of the normal things you do. Do not drive when you are drowsy. When should you call for help? Watch closely for changes in your health, and be sure to contact your doctor if:    · You still have sleep apnea even though you have made lifestyle changes.     · You are thinking of trying a device such as CPAP.     · You are having problems using a CPAP or similar machine. Where can you learn more? Go to https://PeopleAdmin.Activation Life. org and sign in to your Jigsaw account. Enter G378 in the Ampex box to learn more about \"Sleep Apnea: Care Instructions. \"     If you do not have an account, please click on the \"Sign Up Now\" link. Current as of: February 24, 2020               Content Version: 12.6  © 2006-2020 [employer]. Care instructions adapted under license by [facility] ([facility]). If you have questions about a medical condition or this instruction, always ask your healthcare professional. [place] [address] any warranty or liability for your use of this information. You may be receiving a survey from [employer] regarding your visit today.     Please complete the survey to

## 2020-12-22 NOTE — PROGRESS NOTES
2020     Cesar Gaines (:  1985) is a 37 y.o. female, here for evaluation of the following medical concerns:    HPI       Mood Disorder:  Patient presents for follow-up of anxiety disorder. Current complaints include: fatigue, changes in appetite/weight: denies any thoughts of self harm . She denies denies. Symptoms/signs of promise: increased risk taking which she describes as her normal   External stressors: shared custody of children. . Current treatment includes: Celexa- 10 mg dialy . Medication side effects: none. Snores loudly, dating boyfriend and cannot sleep in same room . Pillows, nasal plugs and strips. Not teeth grinding.    hard to get out of bed in am  Feels can wake self up snoring. Has not missed work. Review of Systems   Constitutional: Negative for activity change, chills and fever. HENT: Negative for congestion and sore throat. Eyes: Negative. Respiratory: Negative for chest tightness and shortness of breath. Cardiovascular: Negative for chest pain and leg swelling. Gastrointestinal: Negative for abdominal distention, constipation and diarrhea. Genitourinary: Negative for difficulty urinating. Musculoskeletal: Negative for arthralgias and back pain. Skin: Negative for rash. Neurological: Positive for headaches. Negative for dizziness. Psychiatric/Behavioral: Negative for self-injury, sleep disturbance and suicidal ideas. The patient is nervous/anxious. The patient is not hyperactive. Prior to Visit Medications    Medication Sig Taking?  Authorizing Provider   omeprazole (PRILOSEC) 20 MG delayed release capsule TAKE 1 CAPSULE BY MOUTH ONCE DAILY IN THE MORNING ON AN EMPTY STOMACH Yes SHAHIDA Grossman CNP   escitalopram (LEXAPRO) 10 MG tablet Take 1 tablet by mouth daily Yes SHAHIDA Grossman CNP        Social History     Tobacco Use    Smoking status: Never Smoker    Smokeless tobacco: Never Used   Substance Use Topics  Alcohol use: Yes        Vitals:    12/22/20 1304   BP: 132/84   Site: Left Upper Arm   Position: Sitting   Cuff Size: Medium Adult   Pulse: 83   Temp: 97.4 °F (36.3 °C)   TempSrc: Temporal   SpO2: 98%   Weight: 215 lb 6.4 oz (97.7 kg)   Height: 5' 5\" (1.651 m)     Estimated body mass index is 35.84 kg/m² as calculated from the following:    Height as of this encounter: 5' 5\" (1.651 m). Weight as of this encounter: 215 lb 6.4 oz (97.7 kg). Physical Exam  Vitals signs and nursing note reviewed. Constitutional:       General: She is not in acute distress. Appearance: Normal appearance. She is well-developed. HENT:      Head: Normocephalic and atraumatic. Right Ear: External ear normal.      Left Ear: External ear normal.   Eyes:      General: No scleral icterus. Pupils: Pupils are equal, round, and reactive to light. Neck:      Musculoskeletal: Normal range of motion and neck supple. Cardiovascular:      Rate and Rhythm: Normal rate and regular rhythm. Heart sounds: Normal heart sounds. No murmur. Pulmonary:      Effort: Pulmonary effort is normal. No respiratory distress. Breath sounds: Normal breath sounds. No wheezing. Abdominal:      Palpations: Abdomen is soft. Tenderness: There is no abdominal tenderness. Musculoskeletal: Normal range of motion. Right lower leg: No edema. Left lower leg: No edema. Lymphadenopathy:      Cervical: No cervical adenopathy. Skin:     General: Skin is warm and dry. Neurological:      Mental Status: She is alert and oriented to person, place, and time. Psychiatric:         Behavior: Behavior normal.         Thought Content: Thought content normal.         Judgment: Judgment normal.         ASSESSMENT/PLAN:  1. On SSRI therapy  Continue  No suicidal ideations. - escitalopram (LEXAPRO) 10 MG tablet; Take 1 tablet by mouth daily  Dispense: 90 tablet; Refill: 1    2.  Anxiety  Well controlled  - escitalopram (LEXAPRO) 10 MG tablet; Take 1 tablet by mouth daily  Dispense: 90 tablet; Refill: 1    3. Gastroesophageal reflux disease without esophagitis    - omeprazole (PRILOSEC) 20 MG delayed release capsule; TAKE 1 CAPSULE BY MOUTH ONCE DAILY IN THE MORNING ON AN EMPTY STOMACH  Dispense: 90 capsule; Refill: 1    4. Reactive depression  - escitalopram (LEXAPRO) 10 MG tablet; Take 1 tablet by mouth daily  Dispense: 90 tablet; Refill: 1    5. Snoring  bmi 35  Witnessed apnea. - Baseline Diagnostic Sleep Study; Future      Return in about 3 months (around 3/22/2021) for sleep apnea. An electronic signature was used to authenticate this note.     --SHAHIDA Todd - CNP on 12/25/2020 at 7:57 AM

## 2021-01-04 ENCOUNTER — HOSPITAL ENCOUNTER (OUTPATIENT)
Dept: SLEEP CENTER | Age: 36
Discharge: HOME | End: 2021-01-04

## 2021-01-04 DIAGNOSIS — R06.83 SNORING: ICD-10-CM

## 2021-01-04 PROCEDURE — 95810 POLYSOM 6/> YRS 4/> PARAM: CPT

## 2021-01-05 DIAGNOSIS — G47.33 OBSTRUCTIVE SLEEP APNEA SYNDROME: Primary | ICD-10-CM

## 2021-01-05 LAB — STATUS: NORMAL

## 2021-01-17 ENCOUNTER — HOSPITAL ENCOUNTER (OUTPATIENT)
Dept: SLEEP CENTER | Age: 36
Discharge: HOME | End: 2021-01-17

## 2021-01-17 ENCOUNTER — HOSPITAL ENCOUNTER (OUTPATIENT)
Dept: PREADMISSION TESTING | Age: 36
Setting detail: SPECIMEN
Discharge: HOME | End: 2021-01-17

## 2021-01-17 DIAGNOSIS — G47.33 OBSTRUCTIVE SLEEP APNEA SYNDROME: ICD-10-CM

## 2021-01-17 LAB
SARS-COV-2, RAPID: NOT DETECTED
SARS-COV-2: NORMAL
SARS-COV-2: NORMAL
SOURCE: NORMAL

## 2021-01-17 PROCEDURE — 95811 POLYSOM 6/>YRS CPAP 4/> PARM: CPT

## 2021-01-17 PROCEDURE — U0002 COVID-19 LAB TEST NON-CDC: HCPCS

## 2021-01-18 ENCOUNTER — TELEPHONE (OUTPATIENT)
Dept: ADMINISTRATIVE | Age: 36
End: 2021-01-18

## 2021-01-18 VITALS — WEIGHT: 215 LBS | BODY MASS INDEX: 35.82 KG/M2 | HEIGHT: 65 IN

## 2021-01-18 NOTE — PROGRESS NOTES
Patient arrived for CPAP study 1/17/21 at 9:30 pm.  Patient had COVID rapid testing at [facility] on the way and stated she had a negative result.   [facility] for DME/Eson 2 nasal size small

## 2021-01-19 LAB — STATUS: NORMAL

## 2021-01-25 ENCOUNTER — TELEPHONE (OUTPATIENT)
Dept: FAMILY MEDICINE CLINIC | Age: 36
End: 2021-01-25

## 2021-01-25 NOTE — TELEPHONE ENCOUNTER
I do not see an order from a medical supply company. I do not have it.  Please call the sleep lab and see where pt chose to get her equipment from     thanks

## 2021-01-25 NOTE — TELEPHONE ENCOUNTER
Minoo is calling to confirm a couple faxes that were supposed to be sent for her last week. She said the company she spoke to hasn't received it. Can she get a call back.     Health Maintenance   Topic Date Due    Hepatitis C screen  1985    Varicella vaccine (1 of 2 - 2-dose childhood series) 05/27/1988    HIV screen  05/27/2002    DTaP/Tdap/Td vaccine (1 - Tdap) 05/27/2006    Cervical cancer screen  12/10/2015    Flu vaccine (1) 09/01/2020    Hepatitis A vaccine  Aged Out    Hepatitis B vaccine  Aged Out    Hib vaccine  Aged Out    Meningococcal (ACWY) vaccine  Aged Out    Pneumococcal 0-64 years Vaccine  Aged Out             (applicable per patient's age: Cancer Screenings, Depression Screening, Fall Risk Screening, Immunizations)    AST (U/L)   Date Value   11/13/2019 19     ALT (U/L)   Date Value   11/13/2019 11     BUN (mg/dL)   Date Value   11/13/2019 10      (goal A1C is < 7)   (goal LDL is <100) need 30-50% reduction from baseline     BP Readings from Last 3 Encounters:   12/22/20 132/84   10/25/19 126/86   04/03/19 138/88    (goal /80)      All Future Testing planned in Care[facility]:      Next Visit Date:  Future Appointments   Date Time Provider Arcenio Caraballo   3/2/2021  4:00 PM SHAHIDA Sebastian CNP pc [facility]            Patient Active Problem List:     On SSRI therapy     Obstructive sleep apnea syndrome

## 2021-01-25 NOTE — TELEPHONE ENCOUNTER
Please call the sleep lab since we do not have it, I need to sign it for pt to receive it and follow up in office in 3 months when starting to use.

## 2022-02-24 ENCOUNTER — OFFICE VISIT (OUTPATIENT)
Dept: PRIMARY CARE CLINIC | Age: 37
End: 2022-02-24

## 2022-02-24 ENCOUNTER — TELEPHONE (OUTPATIENT)
Dept: PRIMARY CARE CLINIC | Age: 37
End: 2022-02-24

## 2022-02-24 VITALS
OXYGEN SATURATION: 96 % | BODY MASS INDEX: 34.49 KG/M2 | DIASTOLIC BLOOD PRESSURE: 86 MMHG | HEART RATE: 97 BPM | SYSTOLIC BLOOD PRESSURE: 122 MMHG | HEIGHT: 65 IN | WEIGHT: 207 LBS

## 2022-02-24 DIAGNOSIS — G44.53 HEADACHE, PRIMARY THUNDERCLAP: Primary | ICD-10-CM

## 2022-02-24 DIAGNOSIS — K21.9 GASTROESOPHAGEAL REFLUX DISEASE WITHOUT ESOPHAGITIS: ICD-10-CM

## 2022-02-24 DIAGNOSIS — E66.9 OBESITY (BMI 30.0-34.9): ICD-10-CM

## 2022-02-24 DIAGNOSIS — Z87.09 HISTORY OF URI (UPPER RESPIRATORY INFECTION): ICD-10-CM

## 2022-02-24 LAB
ALBUMIN SERPL-MCNC: 4.3 G/DL
ALP BLD-CCNC: 67 U/L
ALT SERPL-CCNC: 25 U/L
ANION GAP SERPL CALCULATED.3IONS-SCNC: NORMAL MMOL/L
AST SERPL-CCNC: 23 U/L
BASOPHILS ABSOLUTE: 0 /ΜL
BASOPHILS RELATIVE PERCENT: 0.3 %
BILIRUB SERPL-MCNC: 0.6 MG/DL (ref 0.1–1.4)
BUN BLDV-MCNC: 12 MG/DL
CALCIUM SERPL-MCNC: 9.4 MG/DL
CHLORIDE BLD-SCNC: 101 MMOL/L
CO2: 28 MMOL/L
CREAT SERPL-MCNC: 0.59 MG/DL
EOSINOPHILS ABSOLUTE: 0.2 /ΜL
EOSINOPHILS RELATIVE PERCENT: 1.5 %
GFR CALCULATED: NORMAL
GLUCOSE BLD-MCNC: NORMAL MG/DL
HCT VFR BLD CALC: 46.1 % (ref 36–46)
HEMOGLOBIN: 15.5 G/DL (ref 12–16)
LYMPHOCYTES ABSOLUTE: 2.2 /ΜL
LYMPHOCYTES RELATIVE PERCENT: 19.8 %
MCH RBC QN AUTO: 29.6 PG
MCHC RBC AUTO-ENTMCNC: 33.6 G/DL
MCV RBC AUTO: 87.9 FL
MONOCYTES ABSOLUTE: 0.9 /ΜL
MONOCYTES RELATIVE PERCENT: 8.4 %
NEUTROPHILS ABSOLUTE: 7.6 /ΜL
NEUTROPHILS RELATIVE PERCENT: 70 %
PDW BLD-RTO: 13.5 %
PLATELET # BLD: 324 K/ΜL
PMV BLD AUTO: 8.1 FL
POTASSIUM SERPL-SCNC: 4 MMOL/L
RBC # BLD: 5.24 10^6/ΜL
SODIUM BLD-SCNC: 139 MMOL/L
TOTAL PROTEIN: 7.7
WBC # BLD: 10.9 10^3/ML

## 2022-02-24 PROCEDURE — 99213 OFFICE O/P EST LOW 20 MIN: CPT | Performed by: NURSE PRACTITIONER

## 2022-02-24 RX ORDER — OMEPRAZOLE 20 MG/1
CAPSULE, DELAYED RELEASE ORAL
Qty: 90 CAPSULE | Refills: 1 | Status: SHIPPED | OUTPATIENT
Start: 2022-02-24 | End: 2022-09-29 | Stop reason: SDUPTHER

## 2022-02-24 SDOH — ECONOMIC STABILITY: FOOD INSECURITY: WITHIN THE PAST 12 MONTHS, YOU WORRIED THAT YOUR FOOD WOULD RUN OUT BEFORE YOU GOT MONEY TO BUY MORE.: NEVER TRUE

## 2022-02-24 SDOH — ECONOMIC STABILITY: FOOD INSECURITY: WITHIN THE PAST 12 MONTHS, THE FOOD YOU BOUGHT JUST DIDN'T LAST AND YOU DIDN'T HAVE MONEY TO GET MORE.: NEVER TRUE

## 2022-02-24 ASSESSMENT — PATIENT HEALTH QUESTIONNAIRE - PHQ9
SUM OF ALL RESPONSES TO PHQ QUESTIONS 1-9: 0
7. TROUBLE CONCENTRATING ON THINGS, SUCH AS READING THE NEWSPAPER OR WATCHING TELEVISION: 0
5. POOR APPETITE OR OVEREATING: 0
4. FEELING TIRED OR HAVING LITTLE ENERGY: 0
SUM OF ALL RESPONSES TO PHQ QUESTIONS 1-9: 0
9. THOUGHTS THAT YOU WOULD BE BETTER OFF DEAD, OR OF HURTING YOURSELF: 0
2. FEELING DOWN, DEPRESSED OR HOPELESS: 0
1. LITTLE INTEREST OR PLEASURE IN DOING THINGS: 0
SUM OF ALL RESPONSES TO PHQ QUESTIONS 1-9: 0
SUM OF ALL RESPONSES TO PHQ QUESTIONS 1-9: 0
SUM OF ALL RESPONSES TO PHQ9 QUESTIONS 1 & 2: 0
6. FEELING BAD ABOUT YOURSELF - OR THAT YOU ARE A FAILURE OR HAVE LET YOURSELF OR YOUR FAMILY DOWN: 0
3. TROUBLE FALLING OR STAYING ASLEEP: 0
8. MOVING OR SPEAKING SO SLOWLY THAT OTHER PEOPLE COULD HAVE NOTICED. OR THE OPPOSITE, BEING SO FIGETY OR RESTLESS THAT YOU HAVE BEEN MOVING AROUND A LOT MORE THAN USUAL: 0

## 2022-02-24 ASSESSMENT — ENCOUNTER SYMPTOMS
SHORTNESS OF BREATH: 0
WHEEZING: 0
COUGH: 0
CHEST TIGHTNESS: 0
ABDOMINAL DISTENTION: 0
PHOTOPHOBIA: 1
EYE PAIN: 1

## 2022-02-24 ASSESSMENT — SOCIAL DETERMINANTS OF HEALTH (SDOH): HOW HARD IS IT FOR YOU TO PAY FOR THE VERY BASICS LIKE FOOD, HOUSING, MEDICAL CARE, AND HEATING?: NOT HARD AT ALL

## 2022-02-24 NOTE — PROGRESS NOTES
HPI Notes    Name: Mayur Agarwal  : 1985         Chief Complaint:     Chief Complaint   Patient presents with    Headache     Pt states when she has an orgasm, she will get a sharp pain that starts behind her right eye and goes all the way to the base of of her skull. Started about 3 weeks ago. Pt has tried taking meds and it hasn't helped much       History of Present Illness:        HPI    ACUTE VISIT    2 weeks ago pt with covid like symptoms but minor symptoms cold/cough type things. Description of Headaches:  Location of pain: right eye to right front to back of head. Radiation of pain?:back of head  Character of pain:sharp and throbbing  Severity of pain: severe  Accompanying symptoms:  Movement head left to right incresing pain , vertical hurts worse. Laying down relieves pressure 1 pillow. Prodromal sx?: none   Rapidity of onset: yes   Typical duration of individual headache: 60 minutes to 12 hours   Are most headaches similar in presentation? yes   Typical precipitants: sexual activity     SNOOP assessment  S -Systemic Symptoms  Fever no  Weight loss no  Secondary headache risks  Malignancy no  Pregnancy no JAIRON  Anticoagulationno  Hypertension no  Neurologic signs/symptoms-newly acquired  Confusion no  Nuchal rigidity no  (stiffness in neck this week and went to chiropractor this week once  htn no  Papilledema   Cranial nerve dysfunction no  Abnormal motor function no  Onset   With exertion no  Sexual activity yes  Coughing no  Sneezing no  Older than 48years of age no  Younger than 11years of age no  Previous headache history  1st headache in adult > age 29 yes  New onset of different headache yes    Degree of Functional Impairment:   Missed school or work? None   Headache diary? No     Activity triggers?   Menses, ovulation or pregnancy hx JAIRON  Illness  URI recently   Intense or strenuous activity or exerciseno  Altered sleep patterns: too much, too little, jet lag   no  Altered eating patterns: fasting, missing meals no  Bright or flickering lightsno  Excessive or repetitive noises no  Odors, fragrances, tobacco smoke no  Weather seasonal changes no  High altitudes no  Medications including SSRI, SNRI, mental health meds, analgesic overuse, hormonal contraception (progestin with or without estrogen, postmenopausal hormone therapy) no, prilosec     Hx cold sores nothing for last 2 years. Past Medical History:     History reviewed. No pertinent past medical history. Reviewed all health maintenance requirements and ordered appropriate tests  Health Maintenance Due   Topic Date Due    Hepatitis C screen  Never done    Varicella vaccine (1 of 2 - 2-dose childhood series) Never done    COVID-19 Vaccine (1) Never done    HIV screen  Never done    DTaP/Tdap/Td vaccine (1 - Tdap) Never done    Flu vaccine (1) 09/01/2021    Cervical cancer screen  03/19/2022       Past Surgical History:     Past Surgical History:   Procedure Laterality Date    DILATION AND CURETTAGE OF UTERUS      blighted ovum    DILATION AND CURETTAGE OF UTERUS      [facility]    TUBAL LIGATION  2012        Medications:       Prior to Admission medications    Medication Sig Start Date End Date Taking? Authorizing Provider   omeprazole (PRILOSEC) 20 MG delayed release capsule TAKE 1 CAPSULE BY MOUTH ONCE DAILY IN THE MORNING ON AN EMPTY STOMACH 2/24/22  Yes SHAHIDA Haque CNP   rizatriptan (MAXALT-MLT) 5 MG disintegrating tablet Take 1 tablet by mouth once as needed for Migraine May repeat in 2 hours if needed 2/25/22 2/25/22  SHAHIDA Haque CNP        Allergies:       Sulfa antibiotics and Sulfasalazine    Social History:     Tobacco:    reports that she has never smoked. She has never used smokeless tobacco.  Alcohol:      reports current alcohol use. Drug Use:  reports no history of drug use. Family History:     History reviewed. No pertinent family history.     Review of Systems: Review of Systems   Constitutional: Negative for activity change, appetite change, chills and fever. HENT: Negative for congestion. Eyes: Positive for photophobia (with headache) and pain (only with headache ). Respiratory: Negative for cough, chest tightness, shortness of breath and wheezing. Cardiovascular: Negative for chest pain and leg swelling. Gastrointestinal: Negative for abdominal distention. Genitourinary: Negative for difficulty urinating. Musculoskeletal: Negative for arthralgias. Neurological: Positive for headaches. Negative for dizziness, tremors, seizures, speech difficulty, weakness, light-headedness and numbness. Psychiatric/Behavioral: The patient is not nervous/anxious. Physical Exam:     Vitals:  /86   Pulse 97   Ht 5' 5\" (1.651 m)   Wt 207 lb (93.9 kg)   SpO2 96%   BMI 34.45 kg/m²       Physical Exam  Vitals and nursing note reviewed. Constitutional:       General: She is not in acute distress. Appearance: Normal appearance. She is well-developed. HENT:      Head: Normocephalic and atraumatic. Right Ear: Tympanic membrane and external ear normal.      Left Ear: Tympanic membrane and external ear normal.      Nose: No congestion. Mouth/Throat:      Mouth: Mucous membranes are moist.   Eyes:      General: No scleral icterus. Pupils: Pupils are equal, round, and reactive to light. Neck:      Vascular: No carotid bruit (neg kwan). Cardiovascular:      Rate and Rhythm: Normal rate and regular rhythm. Heart sounds: Normal heart sounds. No murmur heard. Pulmonary:      Effort: Pulmonary effort is normal. No respiratory distress. Breath sounds: Normal breath sounds. No wheezing. Abdominal:      General: Bowel sounds are normal.      Palpations: Abdomen is soft. Tenderness: There is no abdominal tenderness. Musculoskeletal:         General: No tenderness (no temporal tenderness kwan ).  Normal range of motion. Cervical back: Normal range of motion and neck supple. Right lower leg: No edema. Left lower leg: No edema. Lymphadenopathy:      Cervical: No cervical adenopathy. Skin:     General: Skin is warm and dry. Findings: No rash. Neurological:      Mental Status: She is alert and oriented to person, place, and time. Cranial Nerves: No cranial nerve deficit (CN 2-12 intact, no pronator drift, rapid finger taping normal , finger to nose normal able to tandem gait. ). Comments: rhomberg neg    Psychiatric:         Behavior: Behavior normal.         Thought Content: Thought content normal.         Judgment: Judgment normal.               Data:     Lab Results   Component Value Date     11/13/2019    K 4.3 11/13/2019     11/13/2019    CO2 22 11/13/2019    BUN 10 11/13/2019    CREATININE 0.70 11/13/2019    GLUCOSE 87 11/13/2019    AST 19 11/13/2019    ALT 11 11/13/2019     Lab Results   Component Value Date    WBC 8.9 11/13/2019    RBC 4.84 11/13/2019    HGB 12.7 11/13/2019    HCT 39.1 11/13/2019    MCV 80.8 11/13/2019    MCH 26.3 11/13/2019    MCHC 32.5 11/13/2019     11/13/2019     Lab Results   Component Value Date    TSH 1.95 11/13/2019     No results found for: CHOL, LDL, HDL, PSA, LABA1C       Assessment & Plan        Diagnosis Orders   1. Headache, primary thunderclap  CBC with Auto Differential    Comprehensive Metabolic Panel    Sedimentation Rate    C-Reactive Protein    Ferritin    MRI BRAIN W WO CONTRAST   2. History of URI (upper respiratory infection)  Sedimentation Rate    C-Reactive Protein    Ferritin   3. Gastroesophageal reflux disease without esophagitis  omeprazole (PRILOSEC) 20 MG delayed release capsule   4. Obesity (BMI 30.0-34. 9)         Differential Dx:  ICH  Optic disc neuritis  Herpes virus  SAH  Glaucoma                  Completed Refills   Requested Prescriptions     Signed Prescriptions Disp Refills    omeprazole (PRILOSEC) 20 MG delayed release capsule 90 capsule 1     Sig: TAKE 1 CAPSULE BY MOUTH ONCE DAILY IN THE MORNING ON AN EMPTY STOMACH     No follow-ups on file. Orders Placed This Encounter   Medications    omeprazole (PRILOSEC) 20 MG delayed release capsule     Sig: TAKE 1 CAPSULE BY MOUTH ONCE DAILY IN THE MORNING ON AN EMPTY STOMACH     Dispense:  90 capsule     Refill:  1     Orders Placed This Encounter   Procedures    MRI BRAIN W WO CONTRAST     No temporal tenderness, no htn, no injury, hx cold sores but not for last 2 years, no vision changes. Omeprazole only med. No fever, URI 2 weeks ago did not test if covid     Standing Status:   Future     Number of Occurrences:   1     Standing Expiration Date:   2/24/2023     Order Specific Question:   STAT Creatinine as needed:     Answer:   No     Order Specific Question:   Reason for exam:     Answer:   new onset acute sharp headache with sexual activity from right eye radiates to neck, lasting 60 min-12 hours , started in last 3 weeks, no loss vision no neuro deficit, severe h/a thunderclap when present new onset headache for pt. Order Specific Question:   What is the sedation requirement? Answer:   None    CBC with Auto Differential     Standing Status:   Future     Standing Expiration Date:   2/24/2023    Comprehensive Metabolic Panel     Standing Status:   Future     Standing Expiration Date:   2/24/2023    Sedimentation Rate     Standing Status:   Future     Standing Expiration Date:   2/24/2023    C-Reactive Protein     Standing Status:   Future     Standing Expiration Date:   2/24/2023    Ferritin     Standing Status:   Future     Standing Expiration Date:   2/24/2023         There are no Patient Instructions on file for this visit.     Electronically signed by SHAHIDA Owens CNP on 2/25/2022 at 8:35 PM           Completed Refills   Requested Prescriptions     Signed Prescriptions Disp Refills    omeprazole (PRILOSEC) 20 MG delayed release capsule 90 capsule 1     Sig: TAKE 1 CAPSULE BY MOUTH ONCE DAILY IN THE MORNING ON AN EMPTY STOMACH         Carole Starks received counseling on the following healthy behaviors: nutrition, exercise and medication adherence  Reviewed prior labs and health maintenance. Continue current medications, diet and exercise. Discussed use, benefit, and side effects of prescribed medications. Barriers to medication compliance addressed. Patient given educational materials - see patient instructions. All patient questions answered. Patient voiced understanding.

## 2022-02-25 DIAGNOSIS — G44.53 HEADACHE, PRIMARY THUNDERCLAP: ICD-10-CM

## 2022-02-25 RX ORDER — RIZATRIPTAN BENZOATE 5 MG/1
5 TABLET, ORALLY DISINTEGRATING ORAL
Qty: 12 TABLET | Refills: 0 | Status: SHIPPED | OUTPATIENT
Start: 2022-02-25 | End: 2022-07-11 | Stop reason: SDUPTHER

## 2022-02-25 NOTE — PROGRESS NOTES
Pt informed MRI brain is normal ,   Suggestions for treatment of headaches with orgasm would be: Magnesium 400mg once a day. maxalt 5 mg once at onset of headache and may repeat x 1 after 2 hours if headache persists. Good water hydration    Should be transient but will treat like migraine. Reviewed with Dr. Sammy Hammer.      Jorge PINEDO CNP

## 2022-02-28 DIAGNOSIS — Z87.09 HISTORY OF URI (UPPER RESPIRATORY INFECTION): ICD-10-CM

## 2022-02-28 DIAGNOSIS — G44.53 HEADACHE, PRIMARY THUNDERCLAP: ICD-10-CM

## 2022-07-18 ENCOUNTER — PATIENT MESSAGE (OUTPATIENT)
Dept: PRIMARY CARE CLINIC | Age: 37
End: 2022-07-18

## 2022-07-18 NOTE — TELEPHONE ENCOUNTER
Helen Learn, we did receive your results today and you have active immunity to Varicella. You're welcome to  a copy at the office, we're in [place] today and [place] the rest of the week. Thanks!

## 2022-07-18 NOTE — TELEPHONE ENCOUNTER
From: Jabier Mckenzie  To: Dimple Rodarte  Sent: 7/18/2022 8:13 AM EDT  Subject: Ariela Dill results    Ayana Foster, you told me to send you a message reminding you about my titer results. Hopefully [facility] sent them today so I can come pick them up.  Thank you, Mary Lopes

## 2022-09-29 ENCOUNTER — OFFICE VISIT (OUTPATIENT)
Dept: PRIMARY CARE CLINIC | Age: 37
End: 2022-09-29
Payer: COMMERCIAL

## 2022-09-29 VITALS
WEIGHT: 220 LBS | OXYGEN SATURATION: 96 % | SYSTOLIC BLOOD PRESSURE: 130 MMHG | DIASTOLIC BLOOD PRESSURE: 86 MMHG | HEART RATE: 86 BPM | HEIGHT: 65 IN | BODY MASS INDEX: 36.65 KG/M2

## 2022-09-29 DIAGNOSIS — E66.01 CLASS 2 SEVERE OBESITY DUE TO EXCESS CALORIES WITH SERIOUS COMORBIDITY AND BODY MASS INDEX (BMI) OF 36.0 TO 36.9 IN ADULT (HCC): Primary | ICD-10-CM

## 2022-09-29 DIAGNOSIS — F41.9 ANXIETY: ICD-10-CM

## 2022-09-29 DIAGNOSIS — Z13.29 SCREENING FOR THYROID DISORDER: ICD-10-CM

## 2022-09-29 DIAGNOSIS — K21.9 GASTROESOPHAGEAL REFLUX DISEASE WITHOUT ESOPHAGITIS: ICD-10-CM

## 2022-09-29 DIAGNOSIS — Z13.0 SCREENING, ANEMIA, DEFICIENCY, IRON: ICD-10-CM

## 2022-09-29 DIAGNOSIS — Z13.1 SCREENING FOR DIABETES MELLITUS: ICD-10-CM

## 2022-09-29 PROCEDURE — 99214 OFFICE O/P EST MOD 30 MIN: CPT | Performed by: NURSE PRACTITIONER

## 2022-09-29 RX ORDER — OMEPRAZOLE 20 MG/1
CAPSULE, DELAYED RELEASE ORAL
Qty: 90 CAPSULE | Refills: 0 | Status: SHIPPED | OUTPATIENT
Start: 2022-09-29

## 2022-09-29 RX ORDER — FAMOTIDINE 40 MG/1
40 TABLET, FILM COATED ORAL EVERY EVENING
Qty: 30 TABLET | Refills: 0 | Status: SHIPPED | OUTPATIENT
Start: 2022-09-29

## 2022-09-29 NOTE — PROGRESS NOTES
[facility] PRIMARY CARE [place]  [address] [place] [id number]  Dept: [phone]  Dept Fax: [phone]    Last encounter 2/24/2022    Weight Management (Pt would like to talk to PCP about Qsymia, a drug for weight loss. ) and Rash (Rash on nose x 2 weeks)       HPI:   Maeve Light is a 30 y.o. female who presentstoday for her medical conditions/complaints as noted below. Maeve Light is c/o of Weight Management (Pt would like to talk to PCP about Qsymia, a drug for weight loss. ) and Rash (Rash on nose x 2 weeks)      HPI  Established patient    GERD - PPI offered, other suggestions avoid acidic or meds that aggravate it. No late night eating, no food/drink 3 hour before bed. Can alternate pepcid some too, try not to do long term PPI risk interstitial nephritis/bone loss. Obesity: BMI 36.6  Hx adipex use  Pt feels can't get self out of habit of eating, constant eating. Pt in nursing school now- quick foods , fast and easy not planning. Last Adipex  6/14/2021 Phentermine 37.5 mg     Keto- carbs and high protein- lost weight and return regular eating. Water  Juice-OJ one glass 16 ounces   No pop  Caffeine rare  No tea. (Sweet tea)     School in am until 2:30 pm- 5 pm ,   Breakfast skipped. 1st year school [address] classes and clinical 5 days a week. Stress level high   Encouraged high protein diet, good hydration. Dietary consult encouraged pt not decided for it. Discussed calories/protein good hydration    Qysmia   Discussed risk benefit of medication   Weight loss expected and healthy habits. Pharmacologic Category  Anorexiant; Antiseizure Agent, Miscellaneous;  Sympathomimetic  Collapse All  Weight management, chronic    Weight management, chronic:    Note: For use as adjunct therapy with a reduced-calorie diet and increased physical activity in pediatric patients; only use in patients 15to <25years of age with an initial BMI in the 95th percentile or higher for age and sex or patients ? 18 years who have a BMI ? 30 kg/m2 or patients with a BMI ? 27 kg/m2 and ?1 weight-associated comorbidity (eg, dyslipidemia). Pregnancy status must be assessed monthly during therapy. Doses should be administered in the morning to prevent insomnia, which may occur if doses are administered in the evening. Therapeutic monitoring parameters for dosage adjustments vary by age; in patients 15to <25years of age, the rate of weight loss and BMI should be used and in patients ? 25years of age, weight should be used. Children ? 12 years and Adolescents <18 years: Qsymia:    Initial: Oral: 3.75 mg phentermine/23 mg topiramate once daily for 14 days, then increase to 7.5 mg phentermine/46 mg topiramate once daily; assess BMI after 12 weeks of therapy and adjust dose as appropriate. Dosage adjustment: Oral:     If during any point in therapy weight loss is >0.9 kg/week, consider a decrease in current dose. After the first 12 weeks of therapy at the 7.5 mg phentermine/46 mg topiramate daily dose, assess BMI. - If BMI reduced by ?3% from baseline, continue current dose of 7.5 mg phentermine/46 mg topiramate once daily.    - If BMI reduced by <3% from baseline after 12 weeks of therapy, increase dose to 11.25 mg phentermine/69 mg topiramate once daily for 14 days, then increase to 15 mg phentermine/92 mg topiramate once daily. After 12 weeks at highest dose, reassess patient's BMI. If BMI reduced by ?5% from baseline, continue current dose. If BMI only reduced by <5%, discontinue therapy, as patient is unlikely to see further efficacy. Adolescents ? 25years of age: Qsymia:    Initial: Oral: 3.75 mg phentermine/23 mg topiramate once daily for 14 days, then increase to 7.5 mg phentermine/46 mg topiramate once daily; assess weight after 12 weeks of therapy and adjust dose as appropriate.     Dosage adjustment: Oral:     After the first 12 weeks of therapy at the 7.5 mg phentermine/46 mg topiramate daily dose, assess weight. - If weight reduced by ?3% from baseline, continue current dose of 7.5 mg phentermine/46 mg topiramate once daily.    - If weight reduced by <3% from baseline after 12 weeks of therapy, increase dose to 11.25 mg phentermine/69 mg topiramate once daily for 14 days, then increase to 15 mg phentermine/92 mg topiramate once daily. After 12 weeks at highest dose, reassess patient's weight. If weight reduced by ?5% from baseline, continue current dose. If weight only reduced by <5%, discontinue therapy, as patient is unlikely to see further efficacy. Discontinuation of therapy: Children and Adolescents: Qsymia: Oral: Doses should be gradually tapered to prevent possibility of seizure activity; for example, patients on the 15 mg phentermine/92 mg topiramate daily dose should taper to every other day for 1 week prior to discontinuation. Dosage adjustment for concomitant therapy: Significant drug interactions exist, requiring dose/frequency adjustment or avoidance. Consult drug interactions database for more information. Dosing: Altered Kidney Function: Pediatric  Children ? 12 years and Adolescents: Oral:    Note: Kidney function may be estimated using the Cockcroft-Gault formula with actual body weight for dosage adjustment purposes. CrCl ? 50 mL/minute: No dosage adjustment necessary. CrCl <50 mL/minute: Maximum daily dose: 7.5 mg phentermine/46 mg topiramate once daily. End-stage kidney disease on dialysis: Avoid use (has not been studied). Dosing: Hepatic Impairment: Pediatric  Children ? 12 years and Adolescents: Oral:    Mild impairment (Child-Ewst class A): No dosage adjustment necessary. Moderate impairment (Child-West class B): Maximum daily dose: 7.5 mg phentermine/46 mg topiramate once daily. Severe impairment (Child-West class C): Avoid use (has not been studied).     Use: Labeled Indications  Weight management, chronic: Adjunct to a reduced-calorie diet and increased physical activity in adults with either an initial BMI of ?30 kg/m2 or an initial BMI of ?27 kg/m2 and at least one weight-related comorbid condition (eg, dyslipidemia, type 2 diabetes, hypertension) and pediatric patients ? 15years of age with an initial BMI in the ?95th percentile standardized for age and sex. * See Uses in [facility] Essentials for additional information. Comparative Efficacy  PHENTERMINE/TOPIRAMATE EXTENDED RELEASE  Clinical Practice Guidelines  Diabetes Mellitus:    ADA, Standards of Medical Care in Diabetes - 2021, January 2021    [facility] FRESNO 2020 Clinical Practice Guideline for Diabetes Management in Chronic Kidney Disease,\" October 2020    Obesity:    AACE/ACE, Comprehensive Clinical Practice Guidelines for Medical Care of Patients with Obesity, July 2016    EM, Pharmacological Management of Obesity: An Nhi 2015    Administration: Oral  Administer in the morning without regard to meals; avoid late evening administration (potential for insomnia). Administration: Pediatric  Oral: Administer in the morning without regard to food; avoid late evening administration (potential for insomnia). Dietary Considerations  Take in the morning; avoid taking in the late evening. Most effective when combined with a low calorie diet, increased physical activity and behavior modification counseling. Storage/Stability  Store at 20°C to 25°C (68°F to 77°F); excursions permitted between 96°E to 30°C (59°F to 86°F). Medication Patient Education with [facility] Considerations  What is this drug used for? It is used to help you lose weight. All drugs may cause side effects. However, many people have no side effects or only have minor side effects.  Call your doctor or get medical help if any of these side effects or any other side effects bother you or do not go away: Headache     Constipation     Dry mouth     Numbness and tingling     Change in taste     Nausea     Diarrhea     Stuffy nose     Sore throat     Back pain     Loss of strength and energy    WARNING/CAUTION: Even though it may be rare, some people may have very bad and sometimes deadly side effects when taking a drug. Tell your doctor or get medical help right away if you have any of the following signs or symptoms that may be related to a very bad side effect:     Low blood sugar like dizziness, headache, fatigue, feeling weak, shaking, fast heartbeat, confusion, increased hunger, or sweating. Depression like thoughts of suicide, anxiety, emotional instability, or confusion. Acidosis like confusion, fast breathing, fast heartbeat, abnormal heartbeat, severe abdominal pain, nausea, vomiting, fatigue, shortness of breath, or loss of strength and energy. Low potassium like muscle pain or weakness, muscle cramps, or an abnormal heartbeat. Urinary tract infection like blood in the urine, burning or painful urination, passing a lot of urine, fever, lower abdominal pain, or pelvic pain. Kidney stone like back pain, abdominal pain, or blood in the urine. Confusion     Chest pain     Fast heartbeat     Severe dizziness     Passing out     Trouble focusing     Trouble with memory     Trouble speaking     Trouble sleeping     Lack of sweat     Agitation     Irritability     Panic attacks     Mood changes     Vision changes     Blurred vision     Eye redness     Eye pain     Eye irritation     Signs of an jeremy  Reviewed prior notes None  Reviewed previous Labs, Imaging, and Hospital Records    History reviewed. No pertinent past medical history. Past Surgical History:   Procedure Laterality Date    DILATION AND CURETTAGE OF UTERUS      blighted ovum    DILATION AND CURETTAGE OF UTERUS      Polyp removal-[facility]    TUBAL LIGATION  2012       History reviewed. No pertinent family history.     Social History     Tobacco Use    Smoking status: Never    Smokeless tobacco: Never   Substance Use Topics    Alcohol use: Yes      Current Outpatient Medications   Medication Sig Dispense Refill    omeprazole (PRILOSEC) 20 MG delayed release capsule TAKE 1 CAPSULE BY MOUTH ONCE DAILY IN THE MORNING ON AN EMPTY STOMACH 90 capsule 0    famotidine (PEPCID) 40 MG tablet Take 1 tablet by mouth every evening For gastric reflux 30 tablet 0    rizatriptan (MAXALT-MLT) 5 MG disintegrating tablet Take 1 tablet by mouth daily as needed for Migraine May repeat in 2 hours if needed (do not exceed 6 doses per month) 12 tablet 0     No current facility-administered medications for this visit. Allergies   Allergen Reactions    Sulfa Antibiotics Hives    Sulfasalazine Hives       Health Maintenance   Topic Date Due    COVID-19 Vaccine (1) Never done    Varicella vaccine (1 of 2 - 2-dose childhood series) Never done    HIV screen  Never done    Hepatitis C screen  Never done    Hepatitis B vaccine (2 of 2 - CpG 2-dose series) 01/05/2022    Cervical cancer screen  03/19/2022    Depression Monitoring  02/24/2023    DTaP/Tdap/Td vaccine (2 - Td or Tdap) 07/08/2032    Flu vaccine  Completed    Hepatitis A vaccine  Aged Out    Hib vaccine  Aged Out    Meningococcal (ACWY) vaccine  Aged Out    Pneumococcal 0-64 years Vaccine  Aged Out       Subjective:      Review of Systems   Constitutional:  Negative for activity change, chills and fever. HENT:  Negative for congestion, rhinorrhea and sinus pain. Eyes: Negative. Respiratory:  Negative for cough and chest tightness. Cardiovascular:  Negative for chest pain and leg swelling. Gastrointestinal:  Negative for abdominal distention and diarrhea. Endocrine: Negative for cold intolerance and heat intolerance. Genitourinary:  Negative for difficulty urinating. Musculoskeletal:  Negative for arthralgias. Skin:  Negative for pallor.    Neurological:  Negative for dizziness and headaches. Psychiatric/Behavioral:  Negative for decreased concentration and sleep disturbance. The patient is not nervous/anxious. Objective:     Physical Exam  Vitals and nursing note reviewed. Constitutional:       General: She is not in acute distress. Appearance: Normal appearance. She is well-developed. HENT:      Head: Normocephalic and atraumatic. Right Ear: Tympanic membrane and external ear normal.      Left Ear: Tympanic membrane and external ear normal.      Nose: No congestion. Mouth/Throat:      Mouth: Mucous membranes are moist.   Eyes:      General: No scleral icterus. Pupils: Pupils are equal, round, and reactive to light. Neck:      Vascular: No carotid bruit (neg kwan). Cardiovascular:      Rate and Rhythm: Normal rate and regular rhythm. Heart sounds: Normal heart sounds. No murmur heard. Pulmonary:      Effort: Pulmonary effort is normal. No respiratory distress. Breath sounds: Normal breath sounds. No wheezing. Abdominal:      Palpations: Abdomen is soft. Tenderness: There is no abdominal tenderness. Musculoskeletal:         General: Normal range of motion. Cervical back: Normal range of motion and neck supple. Right lower leg: No edema. Left lower leg: No edema. Lymphadenopathy:      Cervical: No cervical adenopathy. Skin:     General: Skin is warm and dry. Neurological:      Mental Status: She is alert and oriented to person, place, and time. Psychiatric:         Behavior: Behavior normal.         Thought Content:  Thought content normal.         Judgment: Judgment normal.     /86 (Site: Right Upper Arm, Position: Sitting)   Pulse 86   Ht 5' 5\" (1.651 m)   Wt 220 lb (99.8 kg)   LMP 10/10/2019   SpO2 96%   BMI 36.61 kg/m²     Data:     Lab Results   Component Value Date/Time     02/24/2022 12:00 AM    K 4.0 02/24/2022 12:00 AM     02/24/2022 12:00 AM    CO2 28 02/24/2022 12:00 AM    BUN 12 02/24/2022 12:00 AM    CREATININE 0.59 02/24/2022 12:00 AM    GLUCOSE 87 11/13/2019 12:00 AM    LABALBU 4.3 02/24/2022 12:00 AM    BILITOT 0.6 02/24/2022 12:00 AM    ALKPHOS 67 02/24/2022 12:00 AM    AST 23 02/24/2022 12:00 AM    ALT 25 02/24/2022 12:00 AM     Lab Results   Component Value Date/Time    WBC 10.9 02/24/2022 12:00 AM    RBC 5.24 02/24/2022 12:00 AM    HGB 15.5 02/24/2022 12:00 AM    HCT 46.1 02/24/2022 12:00 AM    MCV 87.9 02/24/2022 12:00 AM    MCH 29.6 02/24/2022 12:00 AM    MCHC 33.6 02/24/2022 12:00 AM    RDW 13.5 02/24/2022 12:00 AM     02/24/2022 12:00 AM    MPV 8.1 02/24/2022 12:00 AM     Lab Results   Component Value Date/Time    TSH 1.95 11/13/2019 12:00 AM     No results found for: CHOL, LDL, HDL, PSA, LABA1C       Assessment & Plan       1. Class 2 severe obesity due to excess calories with serious comorbidity and body mass index (BMI) of 36.0 to 36.9 in adult [facility])  Diet calorie log  Labs to rule out diabetes/thyroid/metabolic syndrome     2. Anxiety  Stable , stress eating with being in school    3. Gastroesophageal reflux disease without esophagitis  Need better control    - omeprazole (PRILOSEC) 20 MG delayed release capsule; TAKE 1 CAPSULE BY MOUTH ONCE DAILY IN THE MORNING ON AN EMPTY STOMACH  Dispense: 90 capsule; Refill: 0  - famotidine (PEPCID) 40 MG tablet; Take 1 tablet by mouth every evening For gastric reflux  Dispense: 30 tablet; Refill: 0    4. Screening for thyroid disorder    - TSH with Reflex; Future    5. Screening, anemia, deficiency, iron    - CBC with Auto Differential; Future    6. Screening for diabetes mellitus    - Comprehensive Metabolic Panel; Future      GERD=gastroesophageal reflux disease    Suggestions for improvement in symptoms include;    1. Elevate the headboard of your bed about 4-8 inches using \"bed blocks\" so as to create an incline to sleep on. Be sure the bed is secure.  This uses gravity to help keep the stomach acid from coming up into the throat. May also add another pillow below your head if it is tolerated. 2. Avoid cigarettes/chew tobacco products, fatty foods, alcohol, chocolate, coffee, mint, citrus juices and tomato products. 3. Avoid sleeping pills. 4. Avoid late evening meals, avoid eating within 3 hours of bedtime. Limit fluids before bed too. 5. If you are overweight , try to trim down    6. Antacids should be taken in full doses immediately after meals and at bedtime. Liquid antacids are more effective than tablets. Other stomach medication    Proton Pump Inhibitors  Such as:  protonix (pantoprazole), Nexium (esomeprazole), prilosec (omeprazole), prevacid (lansoprazole), or Dexilant (dexlansoprazole) all work to decrease the amount of acid that the stomach produces daily. H 2 Antihistamines  pepcid (famotidine) help decrease the inflammatory actions in the stomach by blocking histamine (reducing acid and inhibiting pepsin release) usually used short term. after testing, dietary consult and good michel effort for weight loss will approve qsymia for short time       Completed Refills   Requested Prescriptions     Signed Prescriptions Disp Refills    omeprazole (PRILOSEC) 20 MG delayed release capsule 90 capsule 0     Sig: TAKE 1 CAPSULE BY MOUTH ONCE DAILY IN THE MORNING ON AN EMPTY STOMACH    famotidine (PEPCID) 40 MG tablet 30 tablet 0     Sig: Take 1 tablet by mouth every evening For gastric reflux     Return in about 4 weeks (around 10/27/2022) for follow up results.   Orders Placed This Encounter   Medications    omeprazole (PRILOSEC) 20 MG delayed release capsule     Sig: TAKE 1 CAPSULE BY MOUTH ONCE DAILY IN THE MORNING ON AN EMPTY STOMACH     Dispense:  90 capsule     Refill:  0    famotidine (PEPCID) 40 MG tablet     Sig: Take 1 tablet by mouth every evening For gastric reflux     Dispense:  30 tablet     Refill:  0     Orders Placed This Encounter   Procedures    CBC with Auto Differential Standing Status:   Future     Standing Expiration Date:   9/29/2023    Comprehensive Metabolic Panel     Standing Status:   Future     Standing Expiration Date:   9/29/2023    TSH with Reflex     Standing Status:   Future     Standing Expiration Date:   9/29/2023                  On this date 9/29/2022 I have spent 30 minutes reviewing previous notes, test results and face to face with the patient discussing the diagnosis and importance of compliance with the treatment plan as well as documenting on the day of the visit.      Electronically signed by SHAHIDA Peacock CNP on 10/4/2022 at 12:29 AM

## 2022-10-04 ASSESSMENT — ENCOUNTER SYMPTOMS
CHEST TIGHTNESS: 0
DIARRHEA: 0
RHINORRHEA: 0
ABDOMINAL DISTENTION: 0
SINUS PAIN: 0
COUGH: 0
EYES NEGATIVE: 1

## 2022-10-14 ENCOUNTER — TELEPHONE (OUTPATIENT)
Dept: PRIMARY CARE CLINIC | Age: 37
End: 2022-10-14

## 2022-10-14 NOTE — TELEPHONE ENCOUNTER
Pt called in letting us know she got her labs done @ [facility] (they are scanned into media), and wants to know next steps with prescribing Qsymia.  Please Junior Hernandez

## 2022-10-31 RX ORDER — PHENTERMINE AND TOPIRAMATE 3.75; 23 MG/1; MG/1
1 CAPSULE, EXTENDED RELEASE ORAL DAILY
Qty: 14 CAPSULE | Refills: 0 | Status: SHIPPED | OUTPATIENT
Start: 2022-10-31 | End: 2022-11-16 | Stop reason: DRUGHIGH

## 2022-10-31 NOTE — TELEPHONE ENCOUNTER
Inform patient Qsymia 3.75/23 mg daily taken once a day in am for 14 days sent to [facility]. Unsure if this requires a prior auth so script was sent today. Must have a weight with start date cannot be called must come to office to do please. On day 15 usually we will increase dose to higher must call prior to it being needed . So get started if it is available and come for weight check and waist  circumference. Then 3% weight loss needed in this 12 weeks in order to continue med after that period of time. See me 6 weeks after med start please.

## 2022-11-08 NOTE — TELEPHONE ENCOUNTER
Pt informed, voices understanding    Pt states she picked up her medication last night, and took her 1st dose this morning (11/8). Pt is on the nurses scheduled for 11/9/22 @ 8am for weight and waist circumference. She will make her 6 week check up appt at that time.

## 2022-11-09 ENCOUNTER — NURSE ONLY (OUTPATIENT)
Dept: PRIMARY CARE CLINIC | Age: 37
End: 2022-11-09

## 2022-11-09 VITALS
DIASTOLIC BLOOD PRESSURE: 88 MMHG | BODY MASS INDEX: 35.11 KG/M2 | WEIGHT: 211 LBS | HEART RATE: 82 BPM | SYSTOLIC BLOOD PRESSURE: 130 MMHG | OXYGEN SATURATION: 97 %

## 2022-11-09 DIAGNOSIS — Z76.89 ENCOUNTER FOR WEIGHT MANAGEMENT: Primary | ICD-10-CM

## 2022-11-16 ENCOUNTER — TELEPHONE (OUTPATIENT)
Dept: PRIMARY CARE CLINIC | Age: 37
End: 2022-11-16

## 2022-11-16 DIAGNOSIS — Z76.89 ENCOUNTER FOR WEIGHT MANAGEMENT: ICD-10-CM

## 2022-11-16 DIAGNOSIS — E66.01 CLASS 2 SEVERE OBESITY DUE TO EXCESS CALORIES WITH SERIOUS COMORBIDITY AND BODY MASS INDEX (BMI) OF 36.0 TO 36.9 IN ADULT (HCC): ICD-10-CM

## 2022-11-16 RX ORDER — PHENTERMINE AND TOPIRAMATE 7.5; 46 MG/1; MG/1
1 CAPSULE, EXTENDED RELEASE ORAL DAILY
Qty: 30 CAPSULE | Refills: 0 | Status: SHIPPED | OUTPATIENT
Start: 2022-11-16 | End: 2022-12-16

## 2022-11-16 NOTE — TELEPHONE ENCOUNTER
Patient called for refill of Qsymia and states she has 4 pills left - dose needs raised. Patient uses [facility] in [place]. Last OV: 9/29/2022  Next OV: 12/21/2022  Last Rx: ?

## 2022-11-17 NOTE — TELEPHONE ENCOUNTER
OARRS reviewed    Next dose of Qsymia sent to [facility]. Dose increase to   Phentermine 7.5-46mg one pill daily dose start when lower dose at 1st TWO weeks is done    Please make sure with patient her 6 week follow up appt IS  PRIOR to her NEXT refill need. (This refill will last 30 days see what day she will start and needs appt prior to being out of med)     I cannot fill again until she has heart rate, bp , weight check at in person appt. So if her appt is after the 6 week time she will have to wait for appt to get refill so if this needs adjusted please do it now.      Many thanks  Haleigh Watts CNP

## 2022-11-17 NOTE — TELEPHONE ENCOUNTER
Last OV: 9/29/2022  Next scheduled apt: 12/13/2022      Pt informed of provider advisement and instruction. Made appt prior to 30 days for when refill is due.

## 2022-12-09 ENCOUNTER — TELEPHONE (OUTPATIENT)
Dept: PRIMARY CARE CLINIC | Age: 37
End: 2022-12-09

## 2022-12-09 DIAGNOSIS — E66.01 CLASS 2 SEVERE OBESITY DUE TO EXCESS CALORIES WITH SERIOUS COMORBIDITY AND BODY MASS INDEX (BMI) OF 36.0 TO 36.9 IN ADULT (HCC): ICD-10-CM

## 2022-12-09 DIAGNOSIS — Z76.89 ENCOUNTER FOR WEIGHT MANAGEMENT: ICD-10-CM

## 2022-12-09 RX ORDER — PHENTERMINE AND TOPIRAMATE 7.5; 46 MG/1; MG/1
1 CAPSULE, EXTENDED RELEASE ORAL DAILY
Qty: 30 CAPSULE | Refills: 0 | Status: SHIPPED | OUTPATIENT
Start: 2022-12-09 | End: 2023-01-08

## 2022-12-09 NOTE — TELEPHONE ENCOUNTER
I spoke with pt current weight 199 pounds, needs to see in office soon which has appt. Refill sent to pharmacy.        Chronic weight management 199#, (21# wt. loss with this scrip weeks 4-8 of current dose)

## 2022-12-09 NOTE — TELEPHONE ENCOUNTER
Patient requesting Qsymia be sent to another pharmacy due to cost. Please send to [facility] on [address] in [place]. Pharmacy Phone is [phone].

## 2022-12-13 ENCOUNTER — OFFICE VISIT (OUTPATIENT)
Dept: PRIMARY CARE CLINIC | Age: 37
End: 2022-12-13

## 2022-12-13 VITALS
HEART RATE: 64 BPM | OXYGEN SATURATION: 98 % | WEIGHT: 203 LBS | DIASTOLIC BLOOD PRESSURE: 86 MMHG | SYSTOLIC BLOOD PRESSURE: 130 MMHG | BODY MASS INDEX: 33.78 KG/M2

## 2022-12-13 DIAGNOSIS — E66.09 CLASS 1 OBESITY DUE TO EXCESS CALORIES WITHOUT SERIOUS COMORBIDITY WITH BODY MASS INDEX (BMI) OF 33.0 TO 33.9 IN ADULT: Primary | ICD-10-CM

## 2022-12-13 DIAGNOSIS — F41.9 ANXIETY: ICD-10-CM

## 2022-12-13 PROCEDURE — 99213 OFFICE O/P EST LOW 20 MIN: CPT | Performed by: NURSE PRACTITIONER

## 2022-12-13 ASSESSMENT — ENCOUNTER SYMPTOMS
ABDOMINAL DISTENTION: 0
COUGH: 0
CHEST TIGHTNESS: 0
EYES NEGATIVE: 1
RHINORRHEA: 0
SINUS PRESSURE: 0

## 2022-12-14 NOTE — PROGRESS NOTES
[facility] [address] [address]  Dept: [phone]  Dept Fax: [phone]    Last encounter 9/29/2022    Weight Management (Medication check)       HPI:   Marisol Prince is a 30 y.o. female who presentstoday for her medical conditions/complaints as noted below. Marisol Prince is c/o of Weight Management (Medication check)      Weight Management  Pertinent negatives include no arthralgias, chest pain, chills, congestion, coughing, fever or headaches. Established patient    Qsymia use with chronic weight management  Tolerating me well without side effects  Bp stable, no tachycardia no headache, no tremor. Appetite down. No fever  Sleeping okay       Wt Readings from Last 3 Encounters:   12/13/22 203 lb (92.1 kg)   11/09/22 211 lb (95.7 kg)   09/29/22 220 lb (99.8 kg)      Reviewed prior notes None  Reviewed previous Labs, Imaging, and Hospital Records    History reviewed. No pertinent past medical history. Past Surgical History:   Procedure Laterality Date    DILATION AND CURETTAGE OF UTERUS      blighted ovum    DILATION AND CURETTAGE OF UTERUS      Polyp removal-[facility]    TUBAL LIGATION  2012       History reviewed. No pertinent family history. Social History     Tobacco Use    Smoking status: Never    Smokeless tobacco: Never   Substance Use Topics    Alcohol use: Yes      Current Outpatient Medications   Medication Sig Dispense Refill    Phentermine-Topiramate (QSYMIA) 7.5-46 MG CP24 Take 1 tablet by mouth daily for 30 days.  For obesity BMI 33.1 30 capsule 0    omeprazole (PRILOSEC) 20 MG delayed release capsule TAKE 1 CAPSULE BY MOUTH ONCE DAILY IN THE MORNING ON AN EMPTY STOMACH 90 capsule 0    famotidine (PEPCID) 40 MG tablet Take 1 tablet by mouth every evening For gastric reflux 30 tablet 0    rizatriptan (MAXALT-MLT) 5 MG disintegrating tablet Take 1 tablet by mouth daily as needed for Migraine May repeat in 2 hours if needed (do not exceed 6 doses per month) 12 tablet 0     No current facility-administered medications for this visit. Allergies   Allergen Reactions    Sulfa Antibiotics Hives    Sulfasalazine Hives       Health Maintenance   Topic Date Due    Varicella vaccine (1 of 2 - 2-dose childhood series) Never done    HIV screen  Never done    Hepatitis C screen  Never done    COVID-19 Vaccine (3 - Booster for Moderna series) 02/27/2022    Cervical cancer screen  03/19/2022    Diabetes screen  Never done    Depression Monitoring  02/24/2023    DTaP/Tdap/Td vaccine (2 - Td or Tdap) 07/08/2032    Flu vaccine  Completed    Hepatitis A vaccine  Aged Out    Hib vaccine  Aged Out    Meningococcal (ACWY) vaccine  Aged Out    Pneumococcal 0-64 years Vaccine  Aged Out       Subjective:      Review of Systems   Constitutional:  Negative for activity change, chills and fever. HENT:  Negative for congestion, rhinorrhea and sinus pressure. Eyes: Negative. Respiratory:  Negative for cough and chest tightness. Cardiovascular:  Negative for chest pain and leg swelling. Gastrointestinal:  Negative for abdominal distention. Endocrine: Negative for cold intolerance and heat intolerance. Genitourinary:  Negative for difficulty urinating. Musculoskeletal:  Negative for arthralgias and gait problem. Neurological:  Negative for dizziness and headaches. Psychiatric/Behavioral:  Negative for decreased concentration. The patient is not nervous/anxious. Objective:     Physical Exam  Vitals and nursing note reviewed. Constitutional:       General: She is not in acute distress. Appearance: Normal appearance. She is well-developed. HENT:      Head: Normocephalic and atraumatic. Right Ear: Tympanic membrane and external ear normal.      Left Ear: Tympanic membrane and external ear normal.      Nose: No rhinorrhea.       Mouth/Throat:      Mouth: Mucous membranes are moist. Pharynx: No oropharyngeal exudate. Eyes:      General: No scleral icterus. Pupils: Pupils are equal, round, and reactive to light. Cardiovascular:      Rate and Rhythm: Normal rate and regular rhythm. Heart sounds: Normal heart sounds. No murmur heard. Pulmonary:      Effort: Pulmonary effort is normal. No respiratory distress. Breath sounds: Normal breath sounds. No wheezing. Abdominal:      Palpations: Abdomen is soft. Tenderness: There is no abdominal tenderness. Musculoskeletal:         General: Normal range of motion. Cervical back: Normal range of motion and neck supple. Right lower leg: No edema. Left lower leg: No edema. Lymphadenopathy:      Cervical: No cervical adenopathy. Skin:     General: Skin is warm and dry. Neurological:      Mental Status: She is alert and oriented to person, place, and time. Psychiatric:         Behavior: Behavior normal.         Thought Content:  Thought content normal.         Judgment: Judgment normal.     /86   Pulse 64   Wt 203 lb (92.1 kg)   LMP 10/10/2019   SpO2 98%   BMI 33.78 kg/m²     Data:     Lab Results   Component Value Date/Time     02/24/2022 12:00 AM    K 4.0 02/24/2022 12:00 AM     02/24/2022 12:00 AM    CO2 28 02/24/2022 12:00 AM    BUN 12 02/24/2022 12:00 AM    CREATININE 0.59 02/24/2022 12:00 AM    GLUCOSE 87 11/13/2019 12:00 AM    LABALBU 4.3 02/24/2022 12:00 AM    BILITOT 0.6 02/24/2022 12:00 AM    ALKPHOS 67 02/24/2022 12:00 AM    AST 23 02/24/2022 12:00 AM    ALT 25 02/24/2022 12:00 AM     Lab Results   Component Value Date/Time    WBC 10.9 02/24/2022 12:00 AM    RBC 5.24 02/24/2022 12:00 AM    HGB 15.5 02/24/2022 12:00 AM    HCT 46.1 02/24/2022 12:00 AM    MCV 87.9 02/24/2022 12:00 AM    MCH 29.6 02/24/2022 12:00 AM    MCHC 33.6 02/24/2022 12:00 AM    RDW 13.5 02/24/2022 12:00 AM     02/24/2022 12:00 AM    MPV 8.1 02/24/2022 12:00 AM     Lab Results   Component Value Date/Time    TSH 1.95 11/13/2019 12:00 AM     No results found for: CHOL, LDL, HDL, PSA, LABA1C       Assessment & Plan       1. Class 1 obesity due to excess calories without serious comorbidity with body mass index (BMI) of 33.0 to 33.9 in adult  Qsymia monitoring and in first 12 weeks needs 3% weight loss        2. Anxiety  Doing well    See back in 4 weeks at 12 weeks needs to have 3% loss. Pt in nursing school doing well. Oarrs reviewed and refill sent last week. Completed Refills   Requested Prescriptions      No prescriptions requested or ordered in this encounter     No follow-ups on file. No orders of the defined types were placed in this encounter. No orders of the defined types were placed in this encounter. On this date 12/13/2022 I have spent 24 minutes reviewing previous notes, test results and face to face with the patient discussing the diagnosis and importance of compliance with the treatment plan as well as documenting on the day of the visit.      Electronically signed by SHAHIDA Tripp CNP on 12/13/2022 at 10:16 PM

## 2022-12-15 ENCOUNTER — HOSPITAL ENCOUNTER (OUTPATIENT)
Dept: PREADMISSION TESTING | Age: 37
Setting detail: SPECIMEN
Discharge: HOME | End: 2022-12-15

## 2022-12-15 ENCOUNTER — SCHEDULED TELEPHONE ENCOUNTER (OUTPATIENT)
Dept: PRIMARY CARE CLINIC | Age: 37
End: 2022-12-15

## 2022-12-15 DIAGNOSIS — J06.9 ACUTE URI: Primary | ICD-10-CM

## 2022-12-15 DIAGNOSIS — J06.9 ACUTE URI: ICD-10-CM

## 2022-12-15 LAB
FLU A ANTIGEN: NEGATIVE
FLU B ANTIGEN: NEGATIVE

## 2022-12-15 PROCEDURE — 87804 INFLUENZA ASSAY W/OPTIC: CPT

## 2022-12-15 PROCEDURE — 99441 PR PHYS/QHP TELEPHONE EVALUATION 5-10 MIN: CPT | Performed by: NURSE PRACTITIONER

## 2022-12-15 RX ORDER — OSELTAMIVIR PHOSPHATE 75 MG/1
75 CAPSULE ORAL DAILY
Qty: 10 CAPSULE | Refills: 0 | Status: SHIPPED | OUTPATIENT
Start: 2022-12-15 | End: 2022-12-25

## 2022-12-15 NOTE — PROGRESS NOTES
Marilou Shah is a 30 y.o. female evaluated via telephone on 12/15/2022 for Pharyngitis (Swollen glands in neck, congestion. Denies nausea, fever. Onset yesterday (Pt is vaccinated) )  . Documentation:  I communicated with the patient and/or health care decision maker about   Had influenza vaccine  Children just over influenza A. Had influenza vaccine. No fever. Muscle stiffness/neck ache  No headache,   Cough congestion  Fullness in ears  Sore throat. Details of this discussion including any medical advice provided: influenza testing today. Good hydration   Mucinex/delsym for cough  Testing today  Preventative tamiflu likely needed.  and children both had influenza A in remote past.       Total Time: minutes: 5-10 minutes    Marilou Shah was evaluated through a synchronous (real-time) audio encounter. Patient identification was verified at the start of the visit. She (or guardian if applicable) is aware that this is a billable service, which includes applicable co-pays. This visit was conducted with the patient's (and/or legal guardian's) verbal consent. She has not had a related appointment within my department in the past 7 days or scheduled within the next 24 hours. The patient was located at Home: [address] [place] [address]. The provider was located at Home ([address]: [place].     Note: not billable if this call serves to triage the patient into an appointment for the relevant concern    Denita Hicks, SHAIHDA - YUE

## 2022-12-16 ENCOUNTER — PATIENT MESSAGE (OUTPATIENT)
Dept: PRIMARY CARE CLINIC | Age: 37
End: 2022-12-16

## 2022-12-16 NOTE — TELEPHONE ENCOUNTER
From: Elena Dietz  To: Fermin Soliz  Sent: 12/16/2022 11:37 AM EST  Subject: Tamiflu    Leonid Helms, Nieves just curious as to why I was prescribed Tamiflu when I was negative for the flu and not prescribed an antibiotic? I asked the nurse yesterday and she really didnt give me a good reason. Thank you!  Tony Grace

## 2022-12-19 ENCOUNTER — OFFICE VISIT (OUTPATIENT)
Dept: FAMILY MEDICINE CLINIC | Age: 37
End: 2022-12-19

## 2022-12-19 VITALS
OXYGEN SATURATION: 98 % | SYSTOLIC BLOOD PRESSURE: 110 MMHG | WEIGHT: 201.8 LBS | DIASTOLIC BLOOD PRESSURE: 82 MMHG | RESPIRATION RATE: 20 BRPM | BODY MASS INDEX: 33.62 KG/M2 | HEIGHT: 65 IN | TEMPERATURE: 98.1 F | HEART RATE: 92 BPM

## 2022-12-19 DIAGNOSIS — J40 BRONCHITIS: ICD-10-CM

## 2022-12-19 DIAGNOSIS — B96.89 ACUTE BACTERIAL SINUSITIS: Primary | ICD-10-CM

## 2022-12-19 DIAGNOSIS — J01.90 ACUTE BACTERIAL SINUSITIS: Primary | ICD-10-CM

## 2022-12-19 PROCEDURE — 99212 OFFICE O/P EST SF 10 MIN: CPT | Performed by: NURSE PRACTITIONER

## 2022-12-19 RX ORDER — AZITHROMYCIN 250 MG/1
TABLET, FILM COATED ORAL
Qty: 1 PACKET | Refills: 0 | Status: SHIPPED | OUTPATIENT
Start: 2022-12-19 | End: 2022-12-29

## 2022-12-19 RX ORDER — BENZONATATE 100 MG/1
100 CAPSULE ORAL 3 TIMES DAILY PRN
Qty: 21 CAPSULE | Refills: 0 | Status: SHIPPED | OUTPATIENT
Start: 2022-12-19 | End: 2022-12-26

## 2022-12-19 RX ORDER — GENTAMICIN SULFATE 3 MG/ML
1 SOLUTION/ DROPS OPHTHALMIC 3 TIMES DAILY
Qty: 1 EACH | Refills: 0 | Status: SHIPPED | OUTPATIENT
Start: 2022-12-19 | End: 2022-12-26

## 2022-12-19 ASSESSMENT — ENCOUNTER SYMPTOMS
EYE REDNESS: 1
ABDOMINAL DISTENTION: 0
COUGH: 1
TROUBLE SWALLOWING: 0
WHEEZING: 0
EYE DISCHARGE: 1
VOICE CHANGE: 0
RHINORRHEA: 1
CHEST TIGHTNESS: 0
SORE THROAT: 1
EYE ITCHING: 1

## 2022-12-19 NOTE — PROGRESS NOTES
HPI Notes    Name: Kate Cortes  : 1985         Chief Complaint:     Chief Complaint   Patient presents with    Cough     Cough and loss of voice onset 3 days has taken multiple OTC medication w/ no relief      Conjunctivitis     Bilateral pink eyes        History of Present Illness:        Cough  Associated symptoms include eye redness, rhinorrhea and a sore throat. Pertinent negatives include no chest pain, chills, fever, headaches, rash or wheezing. Conjunctivitis   Associated symptoms include eye itching, congestion, rhinorrhea, sore throat, cough, eye discharge and eye redness. Pertinent negatives include no fever, no headaches, no wheezing and no rash. Pt with URI symptoms onset , fever onset (no thermometer) , on 22. Pt given tamiflu due to significant other recent positive for influenza A and also children prior to that. Eyes pink with injection kwan started this am.   Itching both eyes. Loss voice 3 days ago. Sore throat-trouble swallowing. Clearing throat , blowing nose. Tessalon perles worked, coughing jags. Lymph nodes swollen, neck tender at the beginning. Going to bathroom okay. Appetite normal  Has been working. Continued tamiflu once a day. Pt tested for influenza and covid both today and were negative. Past Medical History:     History reviewed. No pertinent past medical history.    Reviewed all health maintenance requirements and ordered appropriate tests  Health Maintenance Due   Topic Date Due    Varicella vaccine (1 of 2 - 2-dose childhood series) Never done    HIV screen  Never done    Hepatitis C screen  Never done    COVID-19 Vaccine (3 - Booster for Moderna series) 2022    Cervical cancer screen  2022    Diabetes screen  Never done       Past Surgical History:     Past Surgical History:   Procedure Laterality Date    DILATION AND CURETTAGE OF UTERUS      blighted ovum    DILATION AND CURETTAGE OF UTERUS      Polyp removal-[facility]    TUBAL LIGATION  2012        Medications:       Prior to Admission medications    Medication Sig Start Date End Date Taking? Authorizing Provider   azithromycin (ZITHROMAX Z-ANDERSON) 250 MG tablet Two tablets by mouth the first day  then one tablet daily for 4 days sinusitis/bronchitis 12/19/22 12/29/22 Yes SHAHIDA Herrera CNP   benzonatate (TESSALON PERLES) 100 MG capsule Take 1 capsule by mouth 3 times daily as needed for Cough 12/19/22 12/26/22 Yes SHAHIDA Herrera CNP   gentamicin (GARAMYCIN) 0.3 % ophthalmic solution Place 1 drop into both eyes 3 times daily for 7 days Pink eye bilaterally 12/19/22 12/26/22 Yes SHAHIDA Herrera CNP   oseltamivir (TAMIFLU) 75 MG capsule Take 1 capsule by mouth daily for 10 days Exposure to influenza A 12/15/22 12/25/22 Yes SHAHIDA Herrera CNP   Phentermine-Topiramate (QSYMIA) 7.5-46 MG CP24 Take 1 tablet by mouth daily for 30 days. For obesity BMI 33.1 12/9/22 1/8/23 Yes SHAHIDA Herrera CNP   omeprazole (PRILOSEC) 20 MG delayed release capsule TAKE 1 CAPSULE BY MOUTH ONCE DAILY IN THE MORNING ON AN EMPTY STOMACH 9/29/22  Yes SHAHIDA Herrera CNP   famotidine (PEPCID) 40 MG tablet Take 1 tablet by mouth every evening For gastric reflux 9/29/22  Yes SHAHIDA Herrera CNP   rizatriptan (MAXALT-MLT) 5 MG disintegrating tablet Take 1 tablet by mouth daily as needed for Migraine May repeat in 2 hours if needed (do not exceed 6 doses per month) 7/11/22  Yes SHAHIDA Herrera CNP        Allergies:       Sulfa antibiotics and Sulfasalazine    Social History:     Tobacco:    reports that she has never smoked. She has never used smokeless tobacco.  Alcohol:      reports current alcohol use. Drug Use:  reports no history of drug use. Family History:     History reviewed. No pertinent family history. Review of Systems:         Review of Systems   Constitutional:  Positive for activity change.  Negative for appetite change, chills and fever. HENT:  Positive for congestion, rhinorrhea and sore throat. Negative for trouble swallowing and voice change. Eyes:  Positive for discharge, redness and itching. Respiratory:  Positive for cough. Negative for chest tightness and wheezing. Cardiovascular:  Negative for chest pain and leg swelling. Gastrointestinal:  Negative for abdominal distention. Genitourinary:  Negative for difficulty urinating. Skin:  Negative for rash. Neurological:  Negative for dizziness and headaches. Physical Exam:     Vitals:  /82 (Site: Right Upper Arm, Position: Sitting, Cuff Size: Medium Adult)   Pulse 92   Temp 98.1 °F (36.7 °C) (Oral)   Resp 20   Ht 5' 5\" (1.651 m)   Wt 201 lb 12.8 oz (91.5 kg)   LMP 10/10/2019   SpO2 98%   BMI 33.58 kg/m²       Physical Exam  Vitals and nursing note reviewed. Constitutional:       General: She is not in acute distress. Appearance: Normal appearance. She is well-developed. HENT:      Head: Normocephalic and atraumatic. Right Ear: Tympanic membrane and external ear normal.      Left Ear: Tympanic membrane and external ear normal.      Nose: Congestion and rhinorrhea present. Mouth/Throat:      Mouth: Mucous membranes are moist.      Pharynx: Posterior oropharyngeal erythema (maxillary sinus R fullness with thick yellow post nasal drip) present. Eyes:      General: No scleral icterus. Right eye: Discharge present. Left eye: Discharge present. Pupils: Pupils are equal, round, and reactive to light. Comments: Both sclera injected and conjunctiva erythematous  kwan no discharge today. Eyes pruritic, no vision changes no double vision, blurred vision     Cardiovascular:      Rate and Rhythm: Normal rate and regular rhythm. Heart sounds: Normal heart sounds. No murmur heard. Pulmonary:      Effort: Pulmonary effort is normal. No respiratory distress. Breath sounds: Rales (left base) present. No wheezing. Abdominal:      Palpations: Abdomen is soft. Tenderness: There is no abdominal tenderness. Musculoskeletal:         General: Normal range of motion. Cervical back: Normal range of motion and neck supple. Lymphadenopathy:      Cervical: No cervical adenopathy. Skin:     General: Skin is warm and dry. Neurological:      Mental Status: She is alert and oriented to person, place, and time. Psychiatric:         Behavior: Behavior normal.         Thought Content: Thought content normal.         Judgment: Judgment normal.             Data:     Lab Results   Component Value Date/Time     02/24/2022 12:00 AM    K 4.0 02/24/2022 12:00 AM     02/24/2022 12:00 AM    CO2 28 02/24/2022 12:00 AM    BUN 12 02/24/2022 12:00 AM    CREATININE 0.59 02/24/2022 12:00 AM    GLUCOSE 87 11/13/2019 12:00 AM    LABALBU 4.3 02/24/2022 12:00 AM    BILITOT 0.6 02/24/2022 12:00 AM    ALKPHOS 67 02/24/2022 12:00 AM    AST 23 02/24/2022 12:00 AM    ALT 25 02/24/2022 12:00 AM     Lab Results   Component Value Date/Time    WBC 10.9 02/24/2022 12:00 AM    RBC 5.24 02/24/2022 12:00 AM    HGB 15.5 02/24/2022 12:00 AM    HCT 46.1 02/24/2022 12:00 AM    MCV 87.9 02/24/2022 12:00 AM    MCH 29.6 02/24/2022 12:00 AM    MCHC 33.6 02/24/2022 12:00 AM    RDW 13.5 02/24/2022 12:00 AM     02/24/2022 12:00 AM    MPV 8.1 02/24/2022 12:00 AM     Lab Results   Component Value Date/Time    TSH 1.95 11/13/2019 12:00 AM     No results found for: CHOL, LDL, HDL, PSA, LABA1C       Assessment & Plan        Diagnosis Orders   1. Acute bacterial sinusitis  azithromycin (ZITHROMAX Z-ANDERSON) 250 MG tablet      2.  Bronchitis  azithromycin (ZITHROMAX Z-ANDERSON) 250 MG tablet                        Completed Refills   Requested Prescriptions     Signed Prescriptions Disp Refills    azithromycin (ZITHROMAX Z-ANDERSON) 250 MG tablet 1 packet 0     Sig: Two tablets by mouth the first day  then one tablet daily for 4 days sinusitis/bronchitis benzonatate (TESSALON PERLES) 100 MG capsule 21 capsule 0     Sig: Take 1 capsule by mouth 3 times daily as needed for Cough    gentamicin (GARAMYCIN) 0.3 % ophthalmic solution 1 each 0     Sig: Place 1 drop into both eyes 3 times daily for 7 days Pink eye bilaterally     No follow-ups on file. Orders Placed This Encounter   Medications    azithromycin (ZITHROMAX Z-ANDERSON) 250 MG tablet     Sig: Two tablets by mouth the first day  then one tablet daily for 4 days sinusitis/bronchitis     Dispense:  1 packet     Refill:  0    benzonatate (TESSALON PERLES) 100 MG capsule     Sig: Take 1 capsule by mouth 3 times daily as needed for Cough     Dispense:  21 capsule     Refill:  0    gentamicin (GARAMYCIN) 0.3 % ophthalmic solution     Sig: Place 1 drop into both eyes 3 times daily for 7 days Pink eye bilaterally     Dispense:  1 each     Refill:  0     No orders of the defined types were placed in this encounter. There are no Patient Instructions on file for this visit.     Electronically signed by SHAHIDA Sahni CNP on 12/20/2022 at 11:46 PM           Completed Refills   Requested Prescriptions     Signed Prescriptions Disp Refills    azithromycin (ZITHROMAX Z-ANDERSON) 250 MG tablet 1 packet 0     Sig: Two tablets by mouth the first day  then one tablet daily for 4 days sinusitis/bronchitis    benzonatate (TESSALON PERLES) 100 MG capsule 21 capsule 0     Sig: Take 1 capsule by mouth 3 times daily as needed for Cough    gentamicin (GARAMYCIN) 0.3 % ophthalmic solution 1 each 0     Sig: Place 1 drop into both eyes 3 times daily for 7 days Pink eye bilaterally